# Patient Record
Sex: FEMALE | Race: WHITE | NOT HISPANIC OR LATINO
[De-identification: names, ages, dates, MRNs, and addresses within clinical notes are randomized per-mention and may not be internally consistent; named-entity substitution may affect disease eponyms.]

---

## 2019-01-30 ENCOUNTER — TRANSCRIPTION ENCOUNTER (OUTPATIENT)
Age: 74
End: 2019-01-30

## 2019-02-23 ENCOUNTER — TRANSCRIPTION ENCOUNTER (OUTPATIENT)
Age: 74
End: 2019-02-23

## 2019-02-27 ENCOUNTER — TRANSCRIPTION ENCOUNTER (OUTPATIENT)
Age: 74
End: 2019-02-27

## 2020-03-01 ENCOUNTER — TRANSCRIPTION ENCOUNTER (OUTPATIENT)
Age: 75
End: 2020-03-01

## 2020-09-08 PROBLEM — Z00.00 ENCOUNTER FOR PREVENTIVE HEALTH EXAMINATION: Status: ACTIVE | Noted: 2020-09-08

## 2020-10-15 ENCOUNTER — APPOINTMENT (OUTPATIENT)
Dept: THORACIC SURGERY | Facility: CLINIC | Age: 75
End: 2020-10-15

## 2020-11-01 ENCOUNTER — OUTPATIENT (OUTPATIENT)
Dept: OUTPATIENT SERVICES | Facility: HOSPITAL | Age: 75
LOS: 1 days | End: 2020-11-01
Payer: MEDICARE

## 2020-11-09 ENCOUNTER — INPATIENT (INPATIENT)
Facility: HOSPITAL | Age: 75
LOS: 0 days | Discharge: HOME | DRG: 200 | End: 2020-11-10
Attending: STUDENT IN AN ORGANIZED HEALTH CARE EDUCATION/TRAINING PROGRAM | Admitting: STUDENT IN AN ORGANIZED HEALTH CARE EDUCATION/TRAINING PROGRAM
Payer: MEDICARE

## 2020-11-09 VITALS
DIASTOLIC BLOOD PRESSURE: 44 MMHG | RESPIRATION RATE: 18 BRPM | WEIGHT: 125 LBS | SYSTOLIC BLOOD PRESSURE: 105 MMHG | HEIGHT: 65 IN | TEMPERATURE: 100 F | HEART RATE: 82 BPM | OXYGEN SATURATION: 99 %

## 2020-11-09 LAB
ALBUMIN SERPL ELPH-MCNC: 3.9 G/DL — SIGNIFICANT CHANGE UP (ref 3.4–5)
ALP SERPL-CCNC: 92 U/L — SIGNIFICANT CHANGE UP (ref 40–120)
ALT FLD-CCNC: 106 U/L — HIGH (ref 12–42)
ANION GAP SERPL CALC-SCNC: 9 MMOL/L — SIGNIFICANT CHANGE UP (ref 9–16)
APPEARANCE UR: CLEAR — SIGNIFICANT CHANGE UP
APTT BLD: 29.3 SEC — SIGNIFICANT CHANGE UP (ref 27.5–35.5)
AST SERPL-CCNC: 82 U/L — HIGH (ref 15–37)
BACTERIA # UR AUTO: PRESENT /HPF
BASOPHILS # BLD AUTO: 0 K/UL — SIGNIFICANT CHANGE UP (ref 0–0.2)
BASOPHILS NFR BLD AUTO: 0 % — SIGNIFICANT CHANGE UP (ref 0–2)
BILIRUB SERPL-MCNC: 0.4 MG/DL — SIGNIFICANT CHANGE UP (ref 0.2–1.2)
BILIRUB UR-MCNC: NEGATIVE — SIGNIFICANT CHANGE UP
BUN SERPL-MCNC: 14 MG/DL — SIGNIFICANT CHANGE UP (ref 7–23)
CALCIUM SERPL-MCNC: 9.7 MG/DL — SIGNIFICANT CHANGE UP (ref 8.5–10.5)
CHLORIDE SERPL-SCNC: 101 MMOL/L — SIGNIFICANT CHANGE UP (ref 96–108)
CO2 SERPL-SCNC: 28 MMOL/L — SIGNIFICANT CHANGE UP (ref 22–31)
COLOR SPEC: YELLOW — SIGNIFICANT CHANGE UP
CREAT SERPL-MCNC: 0.6 MG/DL — SIGNIFICANT CHANGE UP (ref 0.5–1.3)
DIFF PNL FLD: NEGATIVE — SIGNIFICANT CHANGE UP
EOSINOPHIL # BLD AUTO: 0 K/UL — SIGNIFICANT CHANGE UP (ref 0–0.5)
EOSINOPHIL NFR BLD AUTO: 0 % — SIGNIFICANT CHANGE UP (ref 0–6)
EPI CELLS # UR: SIGNIFICANT CHANGE UP /HPF (ref 0–5)
GLUCOSE SERPL-MCNC: 107 MG/DL — HIGH (ref 70–99)
GLUCOSE UR QL: NEGATIVE — SIGNIFICANT CHANGE UP
HCT VFR BLD CALC: 33.3 % — LOW (ref 34.5–45)
HGB BLD-MCNC: 11.1 G/DL — LOW (ref 11.5–15.5)
INR BLD: 1.06 — SIGNIFICANT CHANGE UP (ref 0.88–1.16)
KETONES UR-MCNC: NEGATIVE — SIGNIFICANT CHANGE UP
LACTATE SERPL-SCNC: 0.8 MMOL/L — SIGNIFICANT CHANGE UP (ref 0.4–2)
LEUKOCYTE ESTERASE UR-ACNC: ABNORMAL
LYMPHOCYTES # BLD AUTO: 1.57 K/UL — SIGNIFICANT CHANGE UP (ref 1–3.3)
LYMPHOCYTES # BLD AUTO: 34 % — SIGNIFICANT CHANGE UP (ref 13–44)
MANUAL SMEAR VERIFICATION: SIGNIFICANT CHANGE UP
MCHC RBC-ENTMCNC: 30.5 PG — SIGNIFICANT CHANGE UP (ref 27–34)
MCHC RBC-ENTMCNC: 33.3 GM/DL — SIGNIFICANT CHANGE UP (ref 32–36)
MCV RBC AUTO: 91.5 FL — SIGNIFICANT CHANGE UP (ref 80–100)
MONOCYTES # BLD AUTO: 0.88 K/UL — SIGNIFICANT CHANGE UP (ref 0–0.9)
MONOCYTES NFR BLD AUTO: 19 % — HIGH (ref 2–14)
NEUTROPHILS # BLD AUTO: 2.17 K/UL — SIGNIFICANT CHANGE UP (ref 1.8–7.4)
NEUTROPHILS NFR BLD AUTO: 47 % — SIGNIFICANT CHANGE UP (ref 43–77)
NITRITE UR-MCNC: NEGATIVE — SIGNIFICANT CHANGE UP
NRBC # BLD: 0 /100 — SIGNIFICANT CHANGE UP (ref 0–0)
NRBC # BLD: SIGNIFICANT CHANGE UP /100 WBCS (ref 0–0)
NT-PROBNP SERPL-SCNC: 124 PG/ML — SIGNIFICANT CHANGE UP
PCO2 BLDV: 45 MMHG — SIGNIFICANT CHANGE UP (ref 41–51)
PH BLDV: 7.4 — SIGNIFICANT CHANGE UP (ref 7.32–7.43)
PH UR: 7 — SIGNIFICANT CHANGE UP (ref 5–8)
PLAT MORPH BLD: NORMAL — SIGNIFICANT CHANGE UP
PLATELET # BLD AUTO: 199 K/UL — SIGNIFICANT CHANGE UP (ref 150–400)
PO2 BLDV: 34 MMHG — LOW (ref 35–40)
POTASSIUM SERPL-MCNC: 4.5 MMOL/L — SIGNIFICANT CHANGE UP (ref 3.5–5.3)
POTASSIUM SERPL-SCNC: 4.5 MMOL/L — SIGNIFICANT CHANGE UP (ref 3.5–5.3)
PROT SERPL-MCNC: 6.9 G/DL — SIGNIFICANT CHANGE UP (ref 6.4–8.2)
PROT UR-MCNC: NEGATIVE MG/DL — SIGNIFICANT CHANGE UP
PROTHROM AB SERPL-ACNC: 12.5 SEC — SIGNIFICANT CHANGE UP (ref 10.6–13.6)
RBC # BLD: 3.64 M/UL — LOW (ref 3.8–5.2)
RBC # FLD: 12.7 % — SIGNIFICANT CHANGE UP (ref 10.3–14.5)
RBC BLD AUTO: NORMAL — SIGNIFICANT CHANGE UP
RBC CASTS # UR COMP ASSIST: < 5 /HPF — SIGNIFICANT CHANGE UP
SAO2 % BLDV: 64 % — SIGNIFICANT CHANGE UP
SARS-COV-2 RNA SPEC QL NAA+PROBE: SIGNIFICANT CHANGE UP
SODIUM SERPL-SCNC: 138 MMOL/L — SIGNIFICANT CHANGE UP (ref 132–145)
SP GR SPEC: 1.01 — SIGNIFICANT CHANGE UP (ref 1–1.03)
TROPONIN I SERPL-MCNC: <0.017 NG/ML — LOW (ref 0.02–0.06)
UROBILINOGEN FLD QL: 0.2 E.U./DL — SIGNIFICANT CHANGE UP
WBC # BLD: 4.62 K/UL — SIGNIFICANT CHANGE UP (ref 3.8–10.5)
WBC # FLD AUTO: 4.62 K/UL — SIGNIFICANT CHANGE UP (ref 3.8–10.5)
WBC UR QL: ABNORMAL /HPF

## 2020-11-09 PROCEDURE — 71275 CT ANGIOGRAPHY CHEST: CPT | Mod: 26

## 2020-11-09 PROCEDURE — 71045 X-RAY EXAM CHEST 1 VIEW: CPT | Mod: 26

## 2020-11-09 PROCEDURE — 99285 EMERGENCY DEPT VISIT HI MDM: CPT | Mod: CS,25

## 2020-11-09 PROCEDURE — 93010 ELECTROCARDIOGRAM REPORT: CPT

## 2020-11-09 RX ORDER — ACETAMINOPHEN 500 MG
650 TABLET ORAL ONCE
Refills: 0 | Status: COMPLETED | OUTPATIENT
Start: 2020-11-09 | End: 2020-11-09

## 2020-11-09 RX ORDER — SODIUM CHLORIDE 9 MG/ML
1000 INJECTION INTRAMUSCULAR; INTRAVENOUS; SUBCUTANEOUS ONCE
Refills: 0 | Status: COMPLETED | OUTPATIENT
Start: 2020-11-09 | End: 2020-11-09

## 2020-11-09 RX ADMIN — SODIUM CHLORIDE 1000 MILLILITER(S): 9 INJECTION INTRAMUSCULAR; INTRAVENOUS; SUBCUTANEOUS at 18:33

## 2020-11-09 RX ADMIN — SODIUM CHLORIDE 1000 MILLILITER(S): 9 INJECTION INTRAMUSCULAR; INTRAVENOUS; SUBCUTANEOUS at 21:09

## 2020-11-09 RX ADMIN — Medication 650 MILLIGRAM(S): at 18:33

## 2020-11-09 NOTE — ED PROVIDER NOTE - OBJECTIVE STATEMENT
74 y/o F with PMH of adenocarcinoma of the left lung s/p microwave ablation with subsequent left PTX, treated and discharged from [facility] 2 days ago, now presents to the ED c/o right-sided chest tightness radiating to back and dyspnea on exertion over the past 24 hours. She states her symptoms are worse when she takes deep breaths, and somewhat improve when she is at rest. She states she did not want to return to [facility] because she was not happy with the care she received, so she decided to come here instead. 76 y/o F with PMH of adenocarcinoma of the left lung s/p microwave ablation with subsequent left PTX, treated and discharged from [facility] 2 days ago, now presents to the ED c/o right-sided chest tightness radiating to back and dyspnea on exertion over the past 24 hours. She states her symptoms are worse when she takes deep breaths, and somewhat improve when she is at rest. She states she did not want to return to [facility] because she was not happy with the care she received, so she decided to come here instead. No known fever at home, but reports feeling weak and fatigued.    Denies syncope, confusion, dysphagia, drooling, hemoptysis, abdo pain, N/V/D 76 y/o F with PMH of adenocarcinoma of the right lung s/p microwave ablation with subsequent right PTX, treated and discharged from [facility] 2 days ago, now presents to the ED c/o right-sided chest tightness radiating to back and dyspnea on exertion over the past 24 hours. She states her symptoms are worse when she takes deep breaths, and somewhat improve when she is at rest. She states she did not want to return to [facility] because she was not happy with the care she received, so she decided to come here instead. No known fever at home, but reports feeling weak and fatigued.    Denies syncope, confusion, dysphagia, drooling, hemoptysis, abdo pain, N/V/D

## 2020-11-09 NOTE — ED ADULT TRIAGE NOTE - CHIEF COMPLAINT QUOTE
Walk in with c/o band like CP and SOB and lethargy, increasing x2days. Pt was d/c from [facility] on Saturday s/p 5day stay for pneumothorax with chest tube removed just prior to discharge.

## 2020-11-09 NOTE — ED PROVIDER NOTE - ATTENDING CONTRIBUTION TO CARE
Pt is a 76yo F with a h/o R lung adenocarcinoma and is s/p microwave ablation recently that was complicated by R sided pneumothorax.  pt was admitted to  and had chest tube/pigtail placement.  Discharged 2 days ago.  She reports she was doing well until yesterday when she developed R sided CP and SOB, described as ZUNIGA.    ROS - Denies syncope, confusion, dysphagia, drooling, hemoptysis, abdo pain, N/V/D  PE - agree with PA exam as above.   A/P - R sided CP and ZUNIGA in setting of lung CA and recent pneumothorax.   IV, labs, Covid 19 swab, CXR.     CXR concerning and will obtain a CTA chest.     No PE.  +Apical pneumothorax on R side.  Will admit for continued treatment and monitoring.  Screened by MICU attending, Dr. Barahona.  Accepted to [facility] bed.

## 2020-11-09 NOTE — ED PROVIDER NOTE - CLINICAL SUMMARY MEDICAL DECISION MAKING FREE TEXT BOX
74 y/o F s/p microwave ablation with subsequent left PTX, D/C'd from [facility] 2 days ago, now presents to the ED with 24 hours of right-sided chest tightness radiating to back and dyspnea on exertion. Currently well-appearing and sitting comfortably in NAD. Will order labs, COVID swab, EKG, imaging and reassess.

## 2020-11-09 NOTE — ED ADULT NURSE NOTE - CHIEF COMPLAINT QUOTE
Walk in with c/o band like CP and SOB and lethargy, increasing x2days. Pt was d/c from [facility] on Saturday s/p 5day stay for pneumothorax with chest tube removed just prior to discharge.

## 2020-11-09 NOTE — ED PROVIDER NOTE - PROGRESS NOTE DETAILS
Labs and imaging were reviewed revealing right apical and inferior pneumothoraces. Patient is sitting comfortably in NAD. Well-appearing with no hypoxia or tachypnea. No previous imaging available here to compare findings. Case was discussed with Dr. Barahona (Intensivist) who recommends admission to [facility]. Labs and imaging were reviewed revealing right apical and inferior pneumothoraces. Patient is sitting comfortably in NAD. Well-appearing with no hypoxia or tachypnea. No previous imaging available here to compare findings. Case was discussed with Dr. Barahona (Intensivist) who recommends admission to [facility]. Case was then endorsed to Dr. Jean ([facility]) who has accepted the patient to [facility]. Pt consents to the admission.

## 2020-11-10 ENCOUNTER — TRANSCRIPTION ENCOUNTER (OUTPATIENT)
Age: 75
End: 2020-11-10

## 2020-11-10 VITALS
RESPIRATION RATE: 18 BRPM | TEMPERATURE: 99 F | SYSTOLIC BLOOD PRESSURE: 122 MMHG | HEART RATE: 68 BPM | OXYGEN SATURATION: 96 % | DIASTOLIC BLOOD PRESSURE: 69 MMHG

## 2020-11-10 DIAGNOSIS — C50.919 MALIGNANT NEOPLASM OF UNSPECIFIED SITE OF UNSPECIFIED FEMALE BREAST: ICD-10-CM

## 2020-11-10 DIAGNOSIS — D64.9 ANEMIA, UNSPECIFIED: ICD-10-CM

## 2020-11-10 DIAGNOSIS — Z29.9 ENCOUNTER FOR PROPHYLACTIC MEASURES, UNSPECIFIED: ICD-10-CM

## 2020-11-10 DIAGNOSIS — R60.0 LOCALIZED EDEMA: ICD-10-CM

## 2020-11-10 DIAGNOSIS — R63.8 OTHER SYMPTOMS AND SIGNS CONCERNING FOOD AND FLUID INTAKE: ICD-10-CM

## 2020-11-10 DIAGNOSIS — C34.90 MALIGNANT NEOPLASM OF UNSPECIFIED PART OF UNSPECIFIED BRONCHUS OR LUNG: ICD-10-CM

## 2020-11-10 DIAGNOSIS — J93.9 PNEUMOTHORAX, UNSPECIFIED: ICD-10-CM

## 2020-11-10 DIAGNOSIS — R74.01 ELEVATION OF LEVELS OF LIVER TRANSAMINASE LEVELS: ICD-10-CM

## 2020-11-10 LAB
ALBUMIN SERPL ELPH-MCNC: 3.6 G/DL — SIGNIFICANT CHANGE UP (ref 3.3–5)
ALP SERPL-CCNC: 76 U/L — SIGNIFICANT CHANGE UP (ref 40–120)
ALT FLD-CCNC: 65 U/L — HIGH (ref 10–45)
ANION GAP SERPL CALC-SCNC: 8 MMOL/L — SIGNIFICANT CHANGE UP (ref 5–17)
AST SERPL-CCNC: 51 U/L — HIGH (ref 10–40)
BILIRUB SERPL-MCNC: 0.3 MG/DL — SIGNIFICANT CHANGE UP (ref 0.2–1.2)
BLD GP AB SCN SERPL QL: NEGATIVE — SIGNIFICANT CHANGE UP
BUN SERPL-MCNC: 10 MG/DL — SIGNIFICANT CHANGE UP (ref 7–23)
CALCIUM SERPL-MCNC: 9.8 MG/DL — SIGNIFICANT CHANGE UP (ref 8.4–10.5)
CHLORIDE SERPL-SCNC: 101 MMOL/L — SIGNIFICANT CHANGE UP (ref 96–108)
CO2 SERPL-SCNC: 26 MMOL/L — SIGNIFICANT CHANGE UP (ref 22–31)
CREAT SERPL-MCNC: 0.45 MG/DL — LOW (ref 0.5–1.3)
GIANT PLATELETS BLD QL SMEAR: PRESENT — SIGNIFICANT CHANGE UP
GLUCOSE SERPL-MCNC: 97 MG/DL — SIGNIFICANT CHANGE UP (ref 70–99)
HAV IGM SER-ACNC: SIGNIFICANT CHANGE UP
HBV CORE IGM SER-ACNC: SIGNIFICANT CHANGE UP
HBV SURFACE AG SER-ACNC: SIGNIFICANT CHANGE UP
HCT VFR BLD CALC: 30.5 % — LOW (ref 34.5–45)
HCV AB S/CO SERPL IA: 0.08 S/CO — SIGNIFICANT CHANGE UP
HCV AB SERPL-IMP: SIGNIFICANT CHANGE UP
HGB BLD-MCNC: 10.2 G/DL — LOW (ref 11.5–15.5)
INR BLD: 0.97 — SIGNIFICANT CHANGE UP (ref 0.88–1.16)
MACROCYTES BLD QL: SLIGHT — SIGNIFICANT CHANGE UP
MAGNESIUM SERPL-MCNC: 2.2 MG/DL — SIGNIFICANT CHANGE UP (ref 1.6–2.6)
MANUAL SMEAR VERIFICATION: SIGNIFICANT CHANGE UP
MCHC RBC-ENTMCNC: 30.3 PG — SIGNIFICANT CHANGE UP (ref 27–34)
MCHC RBC-ENTMCNC: 33.4 GM/DL — SIGNIFICANT CHANGE UP (ref 32–36)
MCV RBC AUTO: 90.5 FL — SIGNIFICANT CHANGE UP (ref 80–100)
METAMYELOCYTES # FLD: 3.6 % — HIGH (ref 0–0)
MICROCYTES BLD QL: SLIGHT — SIGNIFICANT CHANGE UP
OVALOCYTES BLD QL SMEAR: SLIGHT — SIGNIFICANT CHANGE UP
PHOSPHATE SERPL-MCNC: 4.1 MG/DL — SIGNIFICANT CHANGE UP (ref 2.5–4.5)
PLAT MORPH BLD: ABNORMAL
PLATELET # BLD AUTO: 168 K/UL — SIGNIFICANT CHANGE UP (ref 150–400)
POLYCHROMASIA BLD QL SMEAR: SLIGHT — SIGNIFICANT CHANGE UP
POTASSIUM SERPL-MCNC: 4.1 MMOL/L — SIGNIFICANT CHANGE UP (ref 3.5–5.3)
POTASSIUM SERPL-SCNC: 4.1 MMOL/L — SIGNIFICANT CHANGE UP (ref 3.5–5.3)
PROT SERPL-MCNC: 5.7 G/DL — LOW (ref 6–8.3)
PROTHROM AB SERPL-ACNC: 11.6 SEC — SIGNIFICANT CHANGE UP (ref 10.6–13.6)
RBC # BLD: 3.37 M/UL — LOW (ref 3.8–5.2)
RBC # FLD: 12.8 % — SIGNIFICANT CHANGE UP (ref 10.3–14.5)
RBC BLD AUTO: ABNORMAL
RH IG SCN BLD-IMP: POSITIVE — SIGNIFICANT CHANGE UP
SODIUM SERPL-SCNC: 135 MMOL/L — SIGNIFICANT CHANGE UP (ref 135–145)
SPHEROCYTES BLD QL SMEAR: SLIGHT — SIGNIFICANT CHANGE UP
TSH SERPL-MCNC: 2.5 UIU/ML — SIGNIFICANT CHANGE UP (ref 0.35–4.94)
VARIANT LYMPHS # BLD: 1.8 % — SIGNIFICANT CHANGE UP (ref 0–6)
WBC # BLD: 4.4 K/UL — SIGNIFICANT CHANGE UP (ref 3.8–10.5)
WBC # FLD AUTO: 4.4 K/UL — SIGNIFICANT CHANGE UP (ref 3.8–10.5)

## 2020-11-10 PROCEDURE — 87040 BLOOD CULTURE FOR BACTERIA: CPT

## 2020-11-10 PROCEDURE — 85025 COMPLETE CBC W/AUTO DIFF WBC: CPT

## 2020-11-10 PROCEDURE — 84443 ASSAY THYROID STIM HORMONE: CPT

## 2020-11-10 PROCEDURE — 71045 X-RAY EXAM CHEST 1 VIEW: CPT

## 2020-11-10 PROCEDURE — 85027 COMPLETE CBC AUTOMATED: CPT

## 2020-11-10 PROCEDURE — 87635 SARS-COV-2 COVID-19 AMP PRB: CPT

## 2020-11-10 PROCEDURE — 86901 BLOOD TYPING SEROLOGIC RH(D): CPT

## 2020-11-10 PROCEDURE — 85730 THROMBOPLASTIN TIME PARTIAL: CPT

## 2020-11-10 PROCEDURE — 86900 BLOOD TYPING SEROLOGIC ABO: CPT

## 2020-11-10 PROCEDURE — 71275 CT ANGIOGRAPHY CHEST: CPT

## 2020-11-10 PROCEDURE — 93970 EXTREMITY STUDY: CPT

## 2020-11-10 PROCEDURE — 99223 1ST HOSP IP/OBS HIGH 75: CPT | Mod: GC

## 2020-11-10 PROCEDURE — 84100 ASSAY OF PHOSPHORUS: CPT

## 2020-11-10 PROCEDURE — 84484 ASSAY OF TROPONIN QUANT: CPT

## 2020-11-10 PROCEDURE — 87086 URINE CULTURE/COLONY COUNT: CPT

## 2020-11-10 PROCEDURE — 97161 PT EVAL LOW COMPLEX 20 MIN: CPT

## 2020-11-10 PROCEDURE — 86850 RBC ANTIBODY SCREEN: CPT

## 2020-11-10 PROCEDURE — 80074 ACUTE HEPATITIS PANEL: CPT

## 2020-11-10 PROCEDURE — 36415 COLL VENOUS BLD VENIPUNCTURE: CPT

## 2020-11-10 PROCEDURE — 81001 URINALYSIS AUTO W/SCOPE: CPT

## 2020-11-10 PROCEDURE — 93010 ELECTROCARDIOGRAM REPORT: CPT

## 2020-11-10 PROCEDURE — 93005 ELECTROCARDIOGRAM TRACING: CPT | Mod: 76

## 2020-11-10 PROCEDURE — 82803 BLOOD GASES ANY COMBINATION: CPT

## 2020-11-10 PROCEDURE — 80053 COMPREHEN METABOLIC PANEL: CPT

## 2020-11-10 PROCEDURE — 93970 EXTREMITY STUDY: CPT | Mod: 26

## 2020-11-10 PROCEDURE — 96361 HYDRATE IV INFUSION ADD-ON: CPT

## 2020-11-10 PROCEDURE — 83880 ASSAY OF NATRIURETIC PEPTIDE: CPT

## 2020-11-10 PROCEDURE — 83605 ASSAY OF LACTIC ACID: CPT

## 2020-11-10 PROCEDURE — 96360 HYDRATION IV INFUSION INIT: CPT | Mod: XU

## 2020-11-10 PROCEDURE — 85610 PROTHROMBIN TIME: CPT

## 2020-11-10 PROCEDURE — 99223 1ST HOSP IP/OBS HIGH 75: CPT

## 2020-11-10 PROCEDURE — 83735 ASSAY OF MAGNESIUM: CPT

## 2020-11-10 PROCEDURE — 99285 EMERGENCY DEPT VISIT HI MDM: CPT | Mod: 25

## 2020-11-10 PROCEDURE — 71045 X-RAY EXAM CHEST 1 VIEW: CPT | Mod: 26,76

## 2020-11-10 RX ORDER — ACETAMINOPHEN 500 MG
650 TABLET ORAL EVERY 6 HOURS
Refills: 0 | Status: DISCONTINUED | OUTPATIENT
Start: 2020-11-10 | End: 2020-11-10

## 2020-11-10 RX ORDER — INFLUENZA VIRUS VACCINE 15; 15; 15; 15 UG/.5ML; UG/.5ML; UG/.5ML; UG/.5ML
0.5 SUSPENSION INTRAMUSCULAR ONCE
Refills: 0 | Status: DISCONTINUED | OUTPATIENT
Start: 2020-11-10 | End: 2020-11-10

## 2020-11-10 RX ORDER — INFLUENZA VIRUS VACCINE 15; 15; 15; 15 UG/.5ML; UG/.5ML; UG/.5ML; UG/.5ML
0.7 SUSPENSION INTRAMUSCULAR ONCE
Refills: 0 | Status: DISCONTINUED | OUTPATIENT
Start: 2020-11-10 | End: 2020-11-10

## 2020-11-10 RX ORDER — ACETAMINOPHEN 500 MG
2 TABLET ORAL
Qty: 0 | Refills: 0 | DISCHARGE
Start: 2020-11-10

## 2020-11-10 RX ORDER — ENOXAPARIN SODIUM 100 MG/ML
40 INJECTION SUBCUTANEOUS EVERY 24 HOURS
Refills: 0 | Status: DISCONTINUED | OUTPATIENT
Start: 2020-11-10 | End: 2020-11-10

## 2020-11-10 NOTE — H&P ADULT - NSHPPHYSICALEXAM_GEN_ALL_CORE
.  VITAL SIGNS:  T(C): 36.7 (11-10-20 @ 01:16), Max: 37.7 (11-09-20 @ 17:49)  T(F): 98.1 (11-10-20 @ 01:16), Max: 99.8 (11-09-20 @ 17:49)  HR: 76 (11-10-20 @ 01:16) (67 - 82)  BP: 133/71 (11-10-20 @ 01:16) (105/44 - 133/71)  BP(mean): --  RR: 18 (11-10-20 @ 01:16) (16 - 18)  SpO2: 97% (11-10-20 @ 01:16) (96% - 100%)  Wt(kg): --    PHYSICAL EXAM:    Constitutional: Elderly female, resting comfortably in bed; NAD  Head: NC/AT  Eyes: PERRL, EOMI, anicteric sclera  ENT: no nasal discharge; uvula midline, no oropharyngeal erythema or exudates; MMM  Neck: supple; no JVD or thyromegaly  Respiratory: Decreased lung sounds RUL  Cardiac: +S1/S2; RRR; no M/R/G; PMI non-displaced  Gastrointestinal: soft, NT/ND; no rebound or guarding; +BSx4  Back: spine midline, no bony tenderness or step-offs; no CVAT B/L  Extremities: WWP, no clubbing or cyanosis; no peripheral edema  Musculoskeletal: NROM x4; no joint swelling, tenderness or erythema  Dermatologic: Healed scars Right anterior chest s/p chest tube   Neurologic: AAOx3; CNII-XII grossly intact; no focal deficits

## 2020-11-10 NOTE — H&P ADULT - NSHPLABSRESULTS_GEN_ALL_CORE
.  LABS:                         11.1   4.62  )-----------( 199      ( 2020 18:35 )             33.3         138  |  101  |  14  ----------------------------<  107<H>  4.5   |  28  |  0.60    Ca    9.7      2020 18:35    TPro  6.9  /  Alb  3.9  /  TBili  0.4  /  DBili  x   /  AST  82<H>  /  ALT  106<H>  /  AlkPhos  92      PT/INR - ( 2020 18:35 )   PT: 12.5 sec;   INR: 1.06          PTT - ( 2020 18:35 )  PTT:29.3 sec  Urinalysis Basic - ( 2020 23:23 )    Color: Yellow / Appearance: Clear / S.010 / pH: x  Gluc: x / Ketone: NEGATIVE  / Bili: NEGATIVE / Urobili: 0.2 E.U./dL   Blood: x / Protein: NEGATIVE mg/dL / Nitrite: NEGATIVE   Leuk Esterase: Small / RBC: < 5 /HPF / WBC 5-10 /HPF   Sq Epi: x / Non Sq Epi: 0-5 /HPF / Bacteria: Present /HPF      CARDIAC MARKERS ( 2020 18:35 )  <0.017 ng/mL / x     / x     / x     / x          Serum Pro-Brain Natriuretic Peptide: 124 pg/mL ( @ 18:35)    Lactate, Blood: 0.8 mmoL/L ( @ 18:36)      RADIOLOGY, EKG & ADDITIONAL TESTS: Reviewed.

## 2020-11-10 NOTE — H&P ADULT - PROBLEM SELECTOR PLAN 5
Elevated AST/ALT on admission 82/106. Denies any abd pain, no hx of hepatitis, no alcohol use. 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 Denies recent weight loss.   - trend LFTs  - collateral info, previous PET scan    - f/up hepatitis panel   - consider abd US

## 2020-11-10 NOTE — H&P ADULT - HISTORY OF PRESENT ILLNESS
A 74yo F with PMH of adenocarcinoma of the right lung s/p microwave ablation with subsequent right PTX, s/p Chest tube (now removed) and discharged from [facility] 2 days ago, now presents to [facility] with right-sided chest tightness radiating to back and dyspnea on exertion over the past 24 hours. She states her symptoms are worse when she takes deep breaths, and somewhat improve when she is at rest. She states she did not want to return to [facility] because she was not happy with the care she received, so she decided to come here instead. No known fever at home, but reports feeling weak and fatigued.    In ED VS: T 99.8, HR 82, /44, RR 18 and SpO2 99% on RA. Labs s/f hgb 11.1, AST/ALT 82/106, BNP wnl, trops negative x1. CTA with no PE, Right upper lobe consolidative mass measuring 3.7 x 5.5 x 2.7 cm with adjacent ground glass nodular subcentimeter opacities and bronchial wall thickening; Right apical PTX with emphysemous changes c/w recent Chest tube placement. Pt given 1L NS bolus and tylenol x1.    A 76yo F with PMH of adenocarcinoma of the right lung s/p microwave ablation with subsequent right PTX, s/p Chest tube (now removed) and discharged from [facility] 2 days ago, now presents to [facility] with right-sided chest tightness radiating to back and dyspnea on exertion over the past 24 hours. She states her symptoms are worse when she takes deep breaths, and somewhat improve when she is at rest. She states she did not want to return to [facility] because she was not happy with the care she received, so she decided to come here instead. She denies fevers/chills, Cough, palpitations, abd pain, n/v, diarhea.     In ED VS: T 99.8, HR 82, /44, RR 18 and SpO2 99% on RA. Labs s/f hgb 11.1, AST/ALT 82/106, BNP wnl, trops negative x1. CTA with no PE, Right upper lobe consolidative mass measuring 3.7 x 5.5 x 2.7 cm with adjacent ground glass nodular subcentimeter opacities and bronchial wall thickening; Right apical PTX with emphysemous changes c/w recent Chest tube placement. Pt given 1L NS bolus and tylenol x1.    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 She states her symptoms are worse when she takes deep breaths, and somewhat improve when she is at rest. She states she did not want to return to [facility] because she was not happy with the care she received, so she decided to come here instead. She denies fevers/chills, Cough, palpitations, abd pain, n/v, diarhea.     In ED VS: T 99.8, HR 82, /44, RR 18 and SpO2 99% on RA. Labs s/f hgb 11.1, AST/ALT 82/106, BNP wnl, trops negative x1. CTA with no PE, Right upper lobe consolidative mass measuring 3.7 x 5.5 x 2.7 cm with adjacent ground glass nodular subcentimeter opacities and bronchial wall thickening; Right apical PTX with emphysemous changes c/w recent Chest tube placement. Pt given 1L NS bolus and tylenol x1.

## 2020-11-10 NOTE — H&P ADULT - PROBLEM SELECTOR PLAN 2
PT with hx of adenocarcinoma of the right lung (diagnosed last yr) s/p microwave ablation with subsequent right PTX. Reportedly pt recommended for surgery however opted for microwave ablation. PMH of smoking and family hx of cancer. Follows at [facility] and [facility]   - [facility] in AM  - management of PTX above PT with hx of adenocarcinoma of the right lung (diagnosed 2018) s/p microwave ablation with subsequent right PTX. Reportedly pt recommended for surgery however opted for microwave ablation. PMH of smoking and family hx of cancer. Follows at [facility] (Dr. Peres) and [facility]   - [facility] in AM  - management of PTX above

## 2020-11-10 NOTE — DISCHARGE NOTE NURSING/CASE MANAGEMENT/SOCIAL WORK - PATIENT PORTAL LINK FT
You can access the FollowMyHealth Patient Portal offered by [facility] by registering at the following website: [email or web address]. By joining [facility]’s FollowMyHealth portal, you will also be able to view your health information using other applications (apps) compatible with our system.

## 2020-11-10 NOTE — H&P ADULT - PROBLEM SELECTOR PLAN 7
DVT: LOvenox    FULL CODE  RMF DVT: Hold AC for now; restart pending CT surgery recs     FULL CODE  RMF F: none   E: replete prn   N: NPO for now

## 2020-11-10 NOTE — DISCHARGE NOTE PROVIDER - CARE PROVIDER_API CALL
Margo Hua  INTERNAL MEDICINE  [address] [place] [address]  Phone: [phone]  Fax: [phone]  Follow Up Time: Routine

## 2020-11-10 NOTE — CONSULT NOTE ADULT - ASSESSMENT
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 Patient states that she does not wish to return to [facility] as she was unhappy with the care she received. Upon arrival patient found to have Right Apical Pneumothorax on CT, patient reports no shortness of breath, SpO2 97% RA. CT Surgery consulted for intervention and evaluation of pneumothorax.       Neurovascular:   No delirium. Pain well controlled    Cardiovascular:   Hemodynamically stable. HR controlled  per primary team    Respiratory:   02 Sat = 96% on RA.  Clinically stable small apical pneumothorax, no chest tube indicated at this time.   -100% NRB as tolerate for pneumothorax   -Recommend cxr q12 to assess change  -STAT cxr if rapid deterioration   -Encourage Cough, deep breathing and Use of IS 10x / hr while awake.  -Chest PT 4xdaily    GI:   Stable  care per primary team    Renal / :   BUN/Cr Stable  -Continue to monitor I/O's.  care per primary team    Endocrine:    care per primary team     Hematologic:  H/H stable  care per primary team      Case Discussed with Dr. Carpenter, Thoracic surgery will continue to follow. Please contact with any questions or concerns.

## 2020-11-10 NOTE — CHART NOTE - NSCHARTNOTEFT_GEN_A_CORE
Patient discussed with Dr. Carpenter and Dr. Smith.    Chest xray stable.  No intervention required at this time.    Thoracic surgery will sign off.  Please reconsult if needed.

## 2020-11-10 NOTE — CONSULT NOTE ADULT - SUBJECTIVE AND OBJECTIVE BOX
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 She states her symptoms are worse when she takes deep breaths, and somewhat improve when she is at rest. She states she did not want to return to [facility] because she was not happy with the care she received, so she decided to come here instead. She denies fevers/chills, Cough, palpitations, abd pain, n/v, diarhea.     In ED VS: T 99.8, HR 82, /44, RR 18 and SpO2 99% on RA. Labs s/f hgb 11.1, AST/ALT 82/106, BNP wnl, trops negative x1. CTA with no PE, Right upper lobe consolidative mass measuring 3.7 x 5.5 x 2.7 cm with adjacent ground glass nodular subcentimeter opacities and bronchial wall thickening; Right apical PTX with emphysemous changes c/w recent Chest tube placement. Pt given 1L NS bolus and tylenol x1.    (10 Nov 2020 01:12)      PAST MEDICAL & SURGICAL HISTORY:  Breast cancer    Lung cancer        MEDICATIONS  (STANDING):  influenza  Vaccine (HIGH DOSE) 0.7 milliLiter(s) IntraMuscular once    MEDICATIONS  (PRN):  acetaminophen   Tablet .. 650 milliGRAM(s) Oral every 6 hours PRN Mild Pain (1 - 3)      FAMILY HISTORY:      CBC Full  -  ( 10 Nov 2020 07:46 )  WBC Count : 4.40 K/uL  RBC Count : 3.37 M/uL  Hemoglobin : 10.2 g/dL  Hematocrit : 30.5 %  Platelet Count - Automated : 168 K/uL  Mean Cell Volume : 90.5 fl  Mean Cell Hemoglobin : 30.3 pg  Mean Cell Hemoglobin Concentration : 33.4 gm/dL  Auto Neutrophil # : x  Auto Lymphocyte # : x  Auto Monocyte # : x  Auto Eosinophil # : x  Auto Basophil # : x  Auto Neutrophil % : x  Auto Lymphocyte % : x  Auto Monocyte % : x  Auto Eosinophil % : x  Auto Basophil % : x      11-10    135  |  101  |  10  ----------------------------<  97  4.1   |  26  |  0.45<L>    Ca    9.8      10 Nov 2020 07:02  Phos  4.1     11-10  Mg     2.2     10    TPro  5.7<L>  /  Alb  3.6  /  TBili  0.3  /  DBili  x   /  AST  51<H>  /  ALT  65<H>  /  AlkPhos  76  11-10      Urinalysis Basic - ( 2020 23:23 )    Color: Yellow / Appearance: Clear / S.010 / pH: x  Gluc: x / Ketone: NEGATIVE  / Bili: NEGATIVE / Urobili: 0.2 E.U./dL   Blood: x / Protein: NEGATIVE mg/dL / Nitrite: NEGATIVE   Leuk Esterase: Small / RBC: < 5 /HPF / WBC 5-10 /HPF   Sq Epi: x / Non Sq Epi: 0-5 /HPF / Bacteria: Present /HPF          Radiology:    < from: CT Angio Chest PE Protocol w/ IV Cont (20 @ 19:40) >  EXAM:  CT ANGIO CHEST PE PROTOCOL IC                           PROCEDURE DATE:  2020          INTERPRETATION:  CTA (CT angiography) of the CHEST    INDICATION: Chest pain. Straight lung cancer and right-sided pneumothorax. Assess for pulmonary embolism.    TECHNIQUE: CT angiography of the chest was performed during bolus injection of intravenous contrast. Post-processing including the production of axial, coronal and sagittal multiplanar reformatted images and axial and coronal maximum intensity projections (MIPs) was performed.    PRIOR STUDY: None.    FINDINGS:    Pulmonary arteries: No pulmonary embolism is seen. The main pulmonary artery is normal in caliber.    Lungs and large airways: In the right upper lobe there is a 3.7 x 5.5x 2.7 cm consolidative mass with a small area of adjacent groundglass nodular opacification. Within this mass there are multiple small cystic lucencies largest measuring 8 mm (3:49, and coronal series 603:69). There is wall thickening of a right upper subsegmental bronchus contiguous with the mass. There are multiple adjacent bilateral scattered groundglass nodules measuring approximately 4 to 6 cm. There is mosaic attenuation pattern seen within the left upper lobe (coronal series 603:78-63, andaxial 3-D: 40-63). This may represent air trapping from underlying small airways disease or an atypical infectious and/or inflammatory process. Bibasilar peripheral groundglass opacities thought to represent dependent change.    Pleura:  Small right apical pneumothorax and separate smaller right pneumothorax anterior to the mediastinum adjacent to the inferior right upper lobe. No pleural effusions.    Mediastinum and hilar regions: No thoracic lymphadenopathy.    Cardiovascular:  Heart size is normal. No thoracic aortic aneurysm.    Pericardial effusion: No pericardial effusion.    Chest wall and lower neck:  Subcutaneous gas along the right anterior and lateral chest wall. Clip versus microcalcification within the left breast (2:81). Asymmetry of the left breast parenchyma((2:102) clinical mammographic correlation.    Upper abdomen: 5 mm hyperdense lesion liver segment 7, likely a benign hemangioma. 1.6 cm left upper pole renal cyst. A few additional bilateral subcentimeter renal hypodensities, too small to characterize.    Bones: There is moderate dextroscoliosis of the midthoracic spine. Multiple prominent hemangiomas are noted the largest involving T9. No vertebral body collapse. No aggressive osseous lesions.      IMPRESSION:    1. No pulmonary embolism.  2. Right upper lobe consolidative mass measuring 3.7 x 5.5 x 2.7 cm with adjacent ground glass nodular subcentimeter opacities and bronchial wall thickening. These findings are consistent with the patient's known history oflung carcinoma demonstrating lepidic growth. In addition, there are numerous groundglass nodules throughout the bilateral lungs some of which has a mosaic attenuation pattern to suggest an atypical pneumonia and/or air trapping especially in the leftupper lobe. Some of these findings may represent the sequela of recent microwave ablation.  3. Small right apical pneumothorax.  4. Anterior and lateral chest wall subcutaneous emphysema likely secondary to prior chest tube placement.  5. Surgical clip noted in the left breast with asymmetry of the breast parenchyma. Clinical and mammographic correlation is suggested.              Vital Signs Last 24 Hrs  T(C): 36.6 (10 Nov 2020 05:38), Max: 37.7 (2020 17:49)  T(F): 97.9 (10 Nov 2020 05:38), Max: 99.8 (2020 17:49)  HR: 58 (10 Nov 2020 05:38) (58 - 82)  BP: 122/69 (10 Nov 2020 05:38) (105/44 - 133/71)  BP(mean): --  RR: 18 (10 Nov 2020 05:38) (16 - 18)  SpO2: 100% (10 Nov 2020 05:38) (96% - 100%)    REVIEW OF SYSTEMS: per HPI    Physical Exam:  76 yo  woman lying in semi Savage's position, no acute complaints    Head: normocephalic, atraumatic    Eyes: PERRLA, EOMI, no nystagmus, sclera anicteric    ENT: nasal discharge, uvula midline, no oropharyngeal erythema/exudate    Neck: supple, negative JVD, negative carotid bruits, no thyromegaly    Chest: dec BS RUL    Cardiovascular: regular rate and rhythm, neg murmurs/rubs/gallops    Abdomen: soft, non distended, non tender to palpation in all 4 quadrants, negative rebound/guarding, normal bowel sounds    Extremities: WWP, neg cyanosis/clubbing/edema, negative calf tenderness to palpation, negative Gracie's sign    Musculoskeletal:      :     Neurologic Exam:    Motor Exam:    Upper Extremities:     Right:   no focal weakness > 3+/5    Left:     no focal weakness > 3+/5      Lower Extremities:    Right:    no focal weakness > 3+/5    Left :    no focal weakness > 3+/5               Sensation: Intact to light touch x 4 extremities,                                         DTR:            = biceps/     triceps/     brachioradialis                      = patella/   medial hamstring/ankle                      neg clonus                      neg Babinski                        Gait:  not tested        PM&R Impression:    1)    2) no focal weakness    Plan:    1) Physical therapy focusing on therapeutic exercises, bed mobility/transfer out of bed evaluation, progressive ambulation with assistive devices prn.    2) Anticipated Disposition Plan/Recs:    pending functional progress, probable d/c home +/- home physical therapy

## 2020-11-10 NOTE — CONSULT NOTE ADULT - ASSESSMENT
per Internal Medicine    74 yo F with PMH of adenocarcinoma of the right lung s/p microwave ablation with subsequent right PTX, s/p Chest tube (now removed) and discharged from [facility] 2 days ago, now presents to [facility] with right-sided chest tightness radiating to back and dyspnea on exertion over the past 24 hours. CTA with right apical PTX    Problem/Plan - 1:  ·  Problem: Pneumothorax.  Plan: PT presents with right sided apical CP that radiates to back, worse with inhalation. Recent microwave ablation procedure (10/29) by [facility] Dr. Brock complicated by right PTX s/p chest tube placed last week and removed 2 days ago; EKG w/o ischemic changes. Trops negative x1. CTA negative for PE Right upper lobe consolidative mass measuring 3.7 x 5.5 x 2.7 cm with adjacent ground glass nodular subcentimeter opacities and bronchial wall thickening; Right apical PTX with emphysemous changes consistent with recent Chest tube placement. Pt currently HDS and sating well on RA  - serial CXR  - consult CT surgery   - pain control  - monitor vitals, O2 requirments   - collateral from [facility] and [facility].    Problem/Plan - 2:  ·  Problem: Lung cancer.  Plan: PT with hx of adenocarcinoma of the right lung (diagnosed 2018) s/p microwave ablation with subsequent right PTX. Reportedly pt recommended for surgery however opted for microwave ablation. PMH of smoking and family hx of cancer. Follows at [facility] (Dr. Peres) and [facility]   - collateral in AM  - management of PTX above.    Problem/Plan - 3:  ·  Problem: Breast cancer.  Plan: PT reports hx of Left breast cancer 2018; per pt treated with herbal supplementation; at the time recommended for mastectomy. Per pt subsequent PET scan showed "cancer resolved". Declines breast exam, denies any masses  - collateral info from [facility] Dr. Peres.    Problem/Plan - 4:  ·  Problem: Anemia.  Plan: Pt with hx of Anemia; unknown baseline. Hgb 11 on admission. No s/s of bleeding. Per pt, was being worked up for ?Leukemia and ?MM; recommended for BM biopsy but pt declined. Calcium and cr wnl.   - collateral in AM  - Active T/S  - continue to monitor.     Problem/Plan - 5:  ·  Problem: Transaminitis.  Plan: Elevated AST/ALT on admission 82/106. Denies any abd pain, no hx of hepatitis, no alcohol use. 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 Denies recent weight loss.   - trend LFTs  - collateral info, previous PET scan    - f/up hepatitis panel   - consider abd US.     Problem/Plan - 6:  Problem: Nutrition, metabolism, and development symptoms. Plan: F: none   E: replete prn   N: NPO for now.    Problem/Plan - 7:  ·  Problem: Need for prophylactic measure.  Plan: DVT: Hold AC for now; restart pending CT surgery recs     FULL CODE  RMF.

## 2020-11-10 NOTE — CONSULT NOTE ADULT - ATTENDING COMMENTS
Agree with documentation above with following additions:    Patient seen this morning. Reports no shortness of breath. Has some pain and numbness along her chest wall radiating to the anterior chest wall. It is in the distribution of the intercostal space where her intrapleural catheter was placed. She has some scapular pain. Otherwise she has no complaints.     On exam she is sitting comfortably in bed, no respiratory distress and no supplemental oxygen. She has point tenderness on her lateral chest wall superior to her catheter dressing.     CT scan reviewed and pneumothorax is small.     Assessment:   S/p ablation for RUL lung cancer. Apical pneumothorax found on CT scan. Asymptomatic and small. Pain patient complains of is likely musculoskeletal.    Recommendations:  -No drain required for pneumothorax  -Can continue NRB mask and repeat x-ray this afternoon  -Continue pain control for musculoskeletal pain    We will sign off at this point, please contact us with any further questions or concerns.     Alan Carpenter MD  Attending Thoracic Surgeon

## 2020-11-10 NOTE — DISCHARGE NOTE PROVIDER - NSDCCPCAREPLAN_GEN_ALL_CORE_FT
PRINCIPAL DISCHARGE DIAGNOSIS  Diagnosis: Pneumothorax, right  Assessment and Plan of Treatment: A pneumothorax means there is air trapped between the lung and the wall of the chest. We think your pneumothorax is related to the ablation you had. We consulted our cardiothoracic surgeons and they recommended against putting in a chest tube given how small your pneumothorax is. If your shortness of breath gets worse or if you experience more pain in your chest, please come back to the ED so they can take a new chest xray and compare it to the ones we took today.      SECONDARY DISCHARGE DIAGNOSES  Diagnosis: Anemia  Assessment and Plan of Treatment: Your hemoglobin level was 10.2 and showed premature blood cells called Metamyelocytes. We are not sure what this means without running additional blood tests and maybe a bone marrow biopsy. When you talk with an oncologist, please discuss whether you should get more evaluation for your anemia with abnormal blood cells.    Diagnosis: Lung cancer  Assessment and Plan of Treatment: Your lung cancer was treated with microwave ablation, but you should still follow up with an oncologist to determine how often you need re-evaluation and to optimize the rest of your medical problems.

## 2020-11-10 NOTE — H&P ADULT - PROBLEM SELECTOR PLAN 6
F: none   E: replete prn   N: regular F: none   E: replete prn   N: NPO for now ADDENDUM: +lower ext edema, R>L, followup dopplers of lower ext

## 2020-11-10 NOTE — PHYSICAL THERAPY INITIAL EVALUATION ADULT - PERTINENT HX OF CURRENT PROBLEM, REHAB EVAL
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 She states her symptoms are worse when she takes deep breaths, and somewhat improve when she is at rest.

## 2020-11-10 NOTE — H&P ADULT - PROBLEM SELECTOR PLAN 1
PT presents with right sided apical CP that radiates to back, worse with inhalation. Recently diagnosed with right PTX after Lung ablation procedure s/p chest tube placed last week and removed 2 days ago; EKG w/o ischemic changes. Trops negative x1. CTA negative for PE Right upper lobe consolidative mass measuring 3.7 x 5.5 x 2.7 cm with adjacent ground glass nodular subcentimeter opacities and bronchial wall thickening; Right apical PTX with emphysemous changes consistent with recent Chest tube placement. Pt currently HDS and sating well on RA  - serial CXR  - consult CT surgery   - pain control   - Incentive spirometry   - monitor vitals, O2 requirments   - [facility] from [facility] PT presents with right sided apical CP that radiates to back, worse with inhalation. Recent microwave ablation procedure (10/29) by [facility] complicated by right PTX s/p chest tube placed last week and removed 2 days ago; EKG w/o ischemic changes. Trops negative x1. CTA negative for PE Right upper lobe consolidative mass measuring 3.7 x 5.5 x 2.7 cm with adjacent ground glass nodular subcentimeter opacities and bronchial wall thickening; Right apical PTX with emphysemous changes consistent with recent Chest tube placement. Pt currently HDS and sating well on RA  - serial CXR  - consult CT surgery   - pain control  - monitor vitals, O2 requirments   - collateral from [facility]

## 2020-11-10 NOTE — H&P ADULT - ATTENDING COMMENTS
patient seen and examined overnight     reviewed pertinent data , h&p    PE findings as above except lungs CTA b/l; no crepitus noted on palpation of skin at previous Right sided chest tube insertion site ; +lower ext nonpitting edema R>L      1. pneumothorax: small residual PTX, in setting of recent larger PTX s/p microwave ablation; s/p chest tube insertion and removal at ; appreciate CT surgery recs; on 100%NRB o/n, monitor serial CXRs        rest of  plan as above, with noted addenda

## 2020-11-10 NOTE — DISCHARGE NOTE PROVIDER - NSDCFUADDAPPT_GEN_ALL_CORE_FT
If you would like to follow in our general medicine resident clinic, we can connect you with a new primary care physician. Please call [phone] and mention that you were recently hospitalized at [facility] and want to establish care.    Should you choose to pursue pulmonology follow-up with our clinic and oncology follow-up. You can call the following numbers to set up an appointment. If you would like to follow in our general medicine resident clinic, we can connect you with a new primary care physician. Please call [phone] and mention that you were recently hospitalized at [facility] and want to establish care.    Should you choose to pursue pulmonology follow-up, you can call the following number to set up an appointment: [phone]    An appointment will be made for you to follow-up at our oncology clinic. You will have an appointment with Dr. Jacinto Neal on December 12. The clinic will call you to share the time of your appointment. Should you have any questions, the oncology clinic is located at [address] [place] [address], and the phone number is [phone].

## 2020-11-10 NOTE — H&P ADULT - PROBLEM SELECTOR PLAN 4
Pt with hx of Anemia; unknown baseline. Hgb 11 on admission. No s/s of bleeding. Per pt, was being worked up for ?Leukemia and ?MM; recommended for BM biopsy but pt declined. Calcium and cr wnl.   - collateral in AM  - Active T/S  - continue to monitor

## 2020-11-10 NOTE — DISCHARGE NOTE PROVIDER - HOSPITAL COURSE
#Discharge: do not delete    Patient is 74 yo F with past medical history of stage IIb adenocarcinoma of R lung, L breast cancer, skin cancer, brain aneurysm, Hashimoto's s/p microwave ablation 10/29/20 of R lung cancer complicated by subsequent moderate R pneumothorax treated with chest tube (since removed) on 11/3/20.  Presenting with 24 hours pleuritic chest pain and tightness with exertion and deep inhalations two days after discharge from pneumothorax treatment at [facility], found to have R apical pneumothorax on CTA.   Problem List/Main Diagnoses (system-based):   #Pneumothorax  - CTA confirming R pneumothorax, non-operatively managed per CT surgery  # Lung cancer  - s/p microwave ablation 10/29  # Anemia  - no clinical signs of bleeding, diff only notable for increase in metamyelocytes, pt has refused bone marrow biopsy during prior admissions, CBCs trended over stay  # Transaminitis  - elevated AST/ALT found on ED labs, downtrending after IV fluids given  # Weakness  - Likely 2/2 to multiple malignancies, anemia, or pain from pneumothorax  Inpatient treatment course:   74 yo F with cancer history presenting 2 days s/p leaving [facility] after being admitted for management of pneumothorax.   ED stay was notable for CTA showing no PE but small R apical pneumothorax, labs showing BNP and troponin within normal limits, but anemia with Hgb 11 and mild transaminitis that improved after administration of IV fluids. Serial CXR show stable R apical pneumothorax and patient reported lessening of pain over the course of her stay without use of pain medications.   CT surgery was consulted and recommended continuation of serial CXR, observation and PT. Pt has been ambulating well and O2 sat has been above 95% on RA throughout stay. Pt initially put on 100% O2 non-rebreather mask per CT surgery.     New medications: None    Labs to be followed outpatient: None    Exam to be followed outpatient: None     #Discharge: do not delete    Patient is 74 yo F with past medical history of stage IIb adenocarcinoma of R lung, L breast cancer, skin cancer, brain aneurysm, Hashimoto's s/p microwave ablation 10/29/20 of R lung cancer complicated by subsequent moderate R pneumothorax treated with chest tube (since removed) on 11/3/20.  Presenting with 24 hours pleuritic chest pain and tightness with exertion and deep inhalations two days after discharge from pneumothorax treatment at [facility], found to have R apical pneumothorax on CTA.   Problem List/Main Diagnoses (system-based):   #Pneumothorax  - CTA confirming R pneumothorax, non-operatively managed per CT surgery, stable on serial CXR with no hemodynamic instability or hypoxia.  # Lung cancer  - s/p microwave ablation 10/29, patient initially followed at [facility] then transitioned to [facility], no looking to re-establish elsewhere.  # Anemia  - no clinical signs of bleeding, diff only notable for increase in metamyelocytes, pt has refused bone marrow biopsy during prior admissions, CBCs trended over stay  # Transaminitis  - elevated AST/ALT found on ED labs, downtrending after IV fluids given  # Weakness  - Likely 2/2 to malignancies, anemia, or pain from pneumothorax  Inpatient treatment course:   74 yo F with cancer history presenting 2 days s/p leaving [facility] after being admitted for management of pneumothorax.   ED stay was notable for CTA showing no PE but small R apical pneumothorax, labs showing BNP and troponin within normal limits, but anemia with Hgb 11 and mild transaminitis that improved after administration of IV fluids. Serial CXR show stable R apical pneumothorax and patient reported lessening of pain over the course of her stay without use of pain medications.   CT surgery was consulted and recommended continuation of serial CXR, observation and PT. Pt has been ambulating well and O2 sat has been above 95% on RA throughout stay. Pt initially put on 100% O2 non-rebreather mask per CT surgery.     MHx notable for breast cancer with no treatment, lung cancer initially followed at [facility]. Oncologist (Ja) there recommended lobectomy but patient did not agree, obtained second opinion from [facility] and microwave ablation done by Dr. May. Patient reports residual pain and dyspnea after procedure and feels that she was not adequately informed of risks prior to procedure. After the ablation she presented to [facility] for dyspnea and pleuritic chest pain and was found to have pneumothorax with pigtail placed and monitored for several days. Patient feels hurt and disappointed by her care at [facility] and wants to transition oncology to a new system. She also does not have a primary care provider as her previous PCP retired in 2020.    New medications: None    Labs to be followed outpatient: None    Exam to be followed outpatient: None

## 2020-11-10 NOTE — DISCHARGE NOTE NURSING/CASE MANAGEMENT/SOCIAL WORK - NSDCFUADDAPPT_GEN_ALL_CORE_FT
If you would like to follow in our general medicine resident clinic, we can connect you with a new primary care physician. Please call [phone] and mention that you were recently hospitalized at [facility] and want to establish care.    Should you choose to pursue pulmonology follow-up, you can call the following number to set up an appointment: [phone]    An appointment will be made for you to follow-up at our oncology clinic. You will have an appointment with Dr. Jacinto Neal on December 12. The clinic will call you to share the time of your appointment. Should you have any questions, the oncology clinic is located at [address] [place] [address], and the phone number is [phone].

## 2020-11-10 NOTE — H&P ADULT - ASSESSMENT
A 76yo F with PMH of adenocarcinoma of the right lung s/p microwave ablation with subsequent right PTX, s/p Chest tube (now removed) and discharged from [facility] 2 days ago, now presents to [facility] with right-sided chest tightness radiating to back and dyspnea on exertion over the past 24 hours. CTA with right apical PTX

## 2020-11-10 NOTE — H&P ADULT - PROBLEM SELECTOR PLAN 3
PT reports hx of Left breast cancer 2018; per pt treated with herbal supplementation; at the time recommended for mastectomy. Per pt subsequent PET scan showed "cancer resolved". Declines breast exam, denies any masses  - collateral info from [facility] PT reports hx of Left breast cancer 2018; per pt treated with herbal supplementation; at the time recommended for mastectomy. Per pt subsequent PET scan showed "cancer resolved". Declines breast exam, denies any masses  - collateral info from [facility] Dr. Peres

## 2020-11-11 PROBLEM — C34.90 MALIGNANT NEOPLASM OF UNSPECIFIED PART OF UNSPECIFIED BRONCHUS OR LUNG: Chronic | Status: ACTIVE | Noted: 2020-11-09

## 2020-11-11 PROBLEM — C50.919 MALIGNANT NEOPLASM OF UNSPECIFIED SITE OF UNSPECIFIED FEMALE BREAST: Chronic | Status: ACTIVE | Noted: 2020-11-09

## 2020-11-11 LAB
CULTURE RESULTS: SIGNIFICANT CHANGE UP
SPECIMEN SOURCE: SIGNIFICANT CHANGE UP

## 2020-11-12 DIAGNOSIS — E06.3 AUTOIMMUNE THYROIDITIS: ICD-10-CM

## 2020-11-12 DIAGNOSIS — C34.11 MALIGNANT NEOPLASM OF UPPER LOBE, RIGHT BRONCHUS OR LUNG: ICD-10-CM

## 2020-11-12 DIAGNOSIS — Z85.89 PERSONAL HISTORY OF MALIGNANT NEOPLASM OF OTHER ORGANS AND SYSTEMS: ICD-10-CM

## 2020-11-12 DIAGNOSIS — Z87.891 PERSONAL HISTORY OF NICOTINE DEPENDENCE: ICD-10-CM

## 2020-11-12 DIAGNOSIS — J93.9 PNEUMOTHORAX, UNSPECIFIED: ICD-10-CM

## 2020-11-12 DIAGNOSIS — D64.9 ANEMIA, UNSPECIFIED: ICD-10-CM

## 2020-11-12 DIAGNOSIS — R74.01 ELEVATION OF LEVELS OF LIVER TRANSAMINASE LEVELS: ICD-10-CM

## 2020-11-12 DIAGNOSIS — Z85.3 PERSONAL HISTORY OF MALIGNANT NEOPLASM OF BREAST: ICD-10-CM

## 2020-11-12 DIAGNOSIS — Z91.018 ALLERGY TO OTHER FOODS: ICD-10-CM

## 2020-11-12 DIAGNOSIS — R60.0 LOCALIZED EDEMA: ICD-10-CM

## 2020-11-12 DIAGNOSIS — R06.02 SHORTNESS OF BREATH: ICD-10-CM

## 2020-11-15 ENCOUNTER — EMERGENCY (EMERGENCY)
Facility: HOSPITAL | Age: 75
LOS: 1 days | Discharge: HOME | End: 2020-11-15
Attending: EMERGENCY MEDICINE | Admitting: EMERGENCY MEDICINE
Payer: MEDICARE

## 2020-11-15 VITALS
OXYGEN SATURATION: 98 % | HEART RATE: 70 BPM | TEMPERATURE: 98 F | DIASTOLIC BLOOD PRESSURE: 64 MMHG | RESPIRATION RATE: 18 BRPM | SYSTOLIC BLOOD PRESSURE: 111 MMHG

## 2020-11-15 VITALS
HEIGHT: 65 IN | TEMPERATURE: 99 F | HEART RATE: 61 BPM | SYSTOLIC BLOOD PRESSURE: 104 MMHG | RESPIRATION RATE: 17 BRPM | OXYGEN SATURATION: 97 % | DIASTOLIC BLOOD PRESSURE: 61 MMHG | WEIGHT: 121.92 LBS

## 2020-11-15 DIAGNOSIS — R07.9 CHEST PAIN, UNSPECIFIED: ICD-10-CM

## 2020-11-15 DIAGNOSIS — J93.9 PNEUMOTHORAX, UNSPECIFIED: ICD-10-CM

## 2020-11-15 LAB
ALBUMIN SERPL ELPH-MCNC: 3.7 G/DL — SIGNIFICANT CHANGE UP (ref 3.4–5)
ALP SERPL-CCNC: 82 U/L — SIGNIFICANT CHANGE UP (ref 40–120)
ALT FLD-CCNC: 31 U/L — SIGNIFICANT CHANGE UP (ref 12–42)
ANION GAP SERPL CALC-SCNC: 9 MMOL/L — SIGNIFICANT CHANGE UP (ref 9–16)
AST SERPL-CCNC: 18 U/L — SIGNIFICANT CHANGE UP (ref 15–37)
BASOPHILS # BLD AUTO: 0.04 K/UL — SIGNIFICANT CHANGE UP (ref 0–0.2)
BASOPHILS NFR BLD AUTO: 0.5 % — SIGNIFICANT CHANGE UP (ref 0–2)
BILIRUB SERPL-MCNC: 0.3 MG/DL — SIGNIFICANT CHANGE UP (ref 0.2–1.2)
BUN SERPL-MCNC: 11 MG/DL — SIGNIFICANT CHANGE UP (ref 7–23)
CALCIUM SERPL-MCNC: 9.7 MG/DL — SIGNIFICANT CHANGE UP (ref 8.5–10.5)
CHLORIDE SERPL-SCNC: 104 MMOL/L — SIGNIFICANT CHANGE UP (ref 96–108)
CO2 SERPL-SCNC: 26 MMOL/L — SIGNIFICANT CHANGE UP (ref 22–31)
CREAT SERPL-MCNC: 0.49 MG/DL — LOW (ref 0.5–1.3)
CULTURE RESULTS: SIGNIFICANT CHANGE UP
CULTURE RESULTS: SIGNIFICANT CHANGE UP
EOSINOPHIL # BLD AUTO: 0.02 K/UL — SIGNIFICANT CHANGE UP (ref 0–0.5)
EOSINOPHIL NFR BLD AUTO: 0.3 % — SIGNIFICANT CHANGE UP (ref 0–6)
GLUCOSE SERPL-MCNC: 106 MG/DL — HIGH (ref 70–99)
HCT VFR BLD CALC: 34.7 % — SIGNIFICANT CHANGE UP (ref 34.5–45)
HGB BLD-MCNC: 11.4 G/DL — LOW (ref 11.5–15.5)
IMM GRANULOCYTES NFR BLD AUTO: 2 % — HIGH (ref 0–1.5)
LYMPHOCYTES # BLD AUTO: 2.01 K/UL — SIGNIFICANT CHANGE UP (ref 1–3.3)
LYMPHOCYTES # BLD AUTO: 25.5 % — SIGNIFICANT CHANGE UP (ref 13–44)
MCHC RBC-ENTMCNC: 30.1 PG — SIGNIFICANT CHANGE UP (ref 27–34)
MCHC RBC-ENTMCNC: 32.9 GM/DL — SIGNIFICANT CHANGE UP (ref 32–36)
MCV RBC AUTO: 91.6 FL — SIGNIFICANT CHANGE UP (ref 80–100)
MONOCYTES # BLD AUTO: 1.66 K/UL — HIGH (ref 0–0.9)
MONOCYTES NFR BLD AUTO: 21.1 % — HIGH (ref 2–14)
NEUTROPHILS # BLD AUTO: 3.98 K/UL — SIGNIFICANT CHANGE UP (ref 1.8–7.4)
NEUTROPHILS NFR BLD AUTO: 50.6 % — SIGNIFICANT CHANGE UP (ref 43–77)
NRBC # BLD: 0 /100 WBCS — SIGNIFICANT CHANGE UP (ref 0–0)
PLATELET # BLD AUTO: 276 K/UL — SIGNIFICANT CHANGE UP (ref 150–400)
POTASSIUM SERPL-MCNC: 4.1 MMOL/L — SIGNIFICANT CHANGE UP (ref 3.5–5.3)
POTASSIUM SERPL-SCNC: 4.1 MMOL/L — SIGNIFICANT CHANGE UP (ref 3.5–5.3)
PROT SERPL-MCNC: 6.9 G/DL — SIGNIFICANT CHANGE UP (ref 6.4–8.2)
RBC # BLD: 3.79 M/UL — LOW (ref 3.8–5.2)
RBC # FLD: 12.9 % — SIGNIFICANT CHANGE UP (ref 10.3–14.5)
SODIUM SERPL-SCNC: 139 MMOL/L — SIGNIFICANT CHANGE UP (ref 132–145)
SPECIMEN SOURCE: SIGNIFICANT CHANGE UP
SPECIMEN SOURCE: SIGNIFICANT CHANGE UP
TROPONIN I SERPL-MCNC: <0.017 NG/ML — LOW (ref 0.02–0.06)
WBC # BLD: 7.87 K/UL — SIGNIFICANT CHANGE UP (ref 3.8–10.5)
WBC # FLD AUTO: 7.87 K/UL — SIGNIFICANT CHANGE UP (ref 3.8–10.5)

## 2020-11-15 PROCEDURE — 71250 CT THORAX DX C-: CPT | Mod: 26

## 2020-11-15 PROCEDURE — 99285 EMERGENCY DEPT VISIT HI MDM: CPT

## 2020-11-15 PROCEDURE — 93010 ELECTROCARDIOGRAM REPORT: CPT

## 2020-11-15 RX ORDER — ASPIRIN/CALCIUM CARB/MAGNESIUM 324 MG
162 TABLET ORAL ONCE
Refills: 0 | Status: COMPLETED | OUTPATIENT
Start: 2020-11-15 | End: 2020-11-15

## 2020-11-15 RX ORDER — GABAPENTIN 400 MG/1
1 CAPSULE ORAL
Qty: 30 | Refills: 0
Start: 2020-11-15 | End: 2020-11-25

## 2020-11-15 RX ORDER — KETOROLAC TROMETHAMINE 30 MG/ML
30 SYRINGE (ML) INJECTION ONCE
Refills: 0 | Status: DISCONTINUED | OUTPATIENT
Start: 2020-11-15 | End: 2020-11-15

## 2020-11-15 RX ORDER — GABAPENTIN 400 MG/1
1 CAPSULE ORAL
Qty: 30 | Refills: 0
Start: 2020-11-15 | End: 2020-11-24

## 2020-11-15 RX ORDER — GABAPENTIN 400 MG/1
300 CAPSULE ORAL ONCE
Refills: 0 | Status: COMPLETED | OUTPATIENT
Start: 2020-11-15 | End: 2020-11-15

## 2020-11-15 RX ADMIN — Medication 162 MILLIGRAM(S): at 16:27

## 2020-11-15 RX ADMIN — GABAPENTIN 300 MILLIGRAM(S): 400 CAPSULE ORAL at 16:27

## 2020-11-15 RX ADMIN — Medication 30 MILLIGRAM(S): at 16:27

## 2020-11-15 NOTE — ED ADULT TRIAGE NOTE - CHIEF COMPLAINT QUOTE
Pt walked into ED c.o right chest pain that radiates to riht shoulder and arm. Pt states "I was admitted for a pnuemothorax after having an ablation at [place] last week. Thursday I started having pain in my chest and arm which is getting worse. I think I have another pnuemothorax". Pt denies f/chills at this time.

## 2020-11-15 NOTE — ED ADULT NURSE NOTE - CHIEF COMPLAINT QUOTE
Pt walked into ED c.o right chest pain that radiates to riht shoulder and arm. Pt states "I was admitted for a pnuemothorax after having an ablation at [facility] last week. Thursday I started having pain in my chest and arm which is getting worse. I think I have another pnuemothorax". Pt denies f/chills at this time.

## 2020-11-15 NOTE — ED PROVIDER NOTE - NSFOLLOWUPINSTRUCTIONS_ED_ALL_ED_FT
Chest Pain    WHAT YOU NEED TO KNOW:    Chest pain can be caused by a range of conditions, from not serious to life-threatening. Chest pain can be a symptom of a digestive problem, such as acid reflux or a stomach ulcer. An anxiety attack or a strong emotion, such as anger, can also cause chest pain. Infection, inflammation, or a fracture in the bones or cartilage in your chest can cause pain or discomfort. Sometimes chest pain or pressure is caused by poor blood flow to your heart (angina). Chest pain may also be caused by life-threatening conditions such as a heart attack or blood clot in your lungs.     DISCHARGE INSTRUCTIONS:    Call 911 if:   •You have any of the following signs of a heart attack: ?Squeezing, pressure, or pain in your chest      ?You may also have any of the following: ?Discomfort or pain in your back, neck, jaw, stomach, or arm      ?Shortness of breath      ?Nausea or vomiting      ?Lightheadedness or a sudden cold sweat            Return to the emergency department if:   •You have chest discomfort that gets worse, even with medicine.      •You cough or vomit blood.       •Your bowel movements are black or bloody.       •You cannot stop vomiting, or it hurts to swallow.       Contact your healthcare provider if:   •You have questions or concerns about your condition or care.          Medicines:   •Medicines may be given to treat the cause of your chest pain. Examples include pain medicine, anxiety medicine, or medicines to increase blood flow to your heart.       •Do not take certain medicines without asking your healthcare provider first. These include NSAIDs, herbal or vitamin supplements, or hormones (estrogen or progestin).       •Take your medicine as directed. Contact your healthcare provider if you think your medicine is not helping or if you have side effects. Tell him or her if you are allergic to any medicine. Keep a list of the medicines, vitamins, and herbs you take. Include the amounts, and when and why you take them. Bring the list or the pill bottles to follow-up visits. Carry your medicine list with you in case of an emergency.      Follow up with your healthcare provider within 72 hours, or as directed: You may need to return for more tests to find the cause of your chest pain. You may be referred to a specialist, such as a cardiologist or gastroenterologist. Write down your questions so you remember to ask them during your visits.     Healthy living tips: The following are general healthy guidelines. If your chest pain is caused by a heart problem, your healthcare provider will give you specific guidelines to follow.  •Do not smoke. Nicotine and other chemicals in cigarettes and cigars can cause lung and heart damage. Ask your healthcare provider for information if you currently smoke and need help to quit. E-cigarettes or smokeless tobacco still contain nicotine. Talk to your healthcare provider before you use these products.       •Eat a variety of healthy, low-fat, low-salt foods. Healthy foods include fruits, vegetables, whole-grain breads, low-fat dairy products, beans, lean meats, and fish. Ask for more information about a heart healthy diet.      •Drink plenty of water every day. Your body is made of mostly water. Water helps your body to control your temperature and blood pressure. Ask your healthcare provider how much water you should drink every day.      •Ask about activity. Your healthcare provider will tell you which activities to limit or avoid. Ask when you can drive, return to work, and have sex. Ask about the best exercise plan for you.      •Maintain a healthy weight. Ask your healthcare provider how much you should weigh. Ask him or her to help you create a weight loss plan if you are overweight.       •Get the flu and pneumonia vaccines. All adults should get the influenza (flu) vaccine. Get it every year as soon as it becomes available. The pneumococcal vaccine is given to adults aged 65 years or older. The vaccine is given every 5 years to prevent pneumococcal disease, such as pneumonia.      If you have a stent:   •Carry your stent card with you at all times.       •Let all healthcare providers know that you have a stent. YOUR RIGHT SIDED PNEUMOTHORAX HAS ALMOST COMPLETELY RESOLVED. THE VERY SMALL AMOUNT OF AIR STILL AT THE APEX OF YOUR CHEST WALL IS LIKELY THE SOURCE OF YOUR PERSISTENT PAIN. PLEASE CONTINUE WITH THE TWO MEDICATIONS PRESCRIBED, GABAPENTIN AND IBUPROFEN, FOR PAIN CONTROL AND FOLLOW UP THIS WEEK WITH YOUR SURGEON FOR FURTHER MANAGEMENT. AT THIS TIME, YOU DO NOT NEED A CHEST TUBE. WITH TIME, THIS SHOULD RESOLVE. HOWEVER, IF YOUR PAIN BECOMES ACUTELY WORSE YOU DEVELOP WORSENING SHORTNESS OF BREATH, PLEASE RETURN TO THE EMERGENCY DEPARTMENT.    Chest Pain    WHAT YOU NEED TO KNOW:    Chest pain can be caused by a range of conditions, from not serious to life-threatening. Chest pain can be a symptom of a digestive problem, such as acid reflux or a stomach ulcer. An anxiety attack or a strong emotion, such as anger, can also cause chest pain. Infection, inflammation, or a fracture in the bones or cartilage in your chest can cause pain or discomfort. Sometimes chest pain or pressure is caused by poor blood flow to your heart (angina). Chest pain may also be caused by life-threatening conditions such as a heart attack or blood clot in your lungs.     DISCHARGE INSTRUCTIONS:    Call 911 if:   •You have any of the following signs of a heart attack: ?Squeezing, pressure, or pain in your chest      ?You may also have any of the following: ?Discomfort or pain in your back, neck, jaw, stomach, or arm      ?Shortness of breath      ?Nausea or vomiting      ?Lightheadedness or a sudden cold sweat            Return to the emergency department if:   •You have chest discomfort that gets worse, even with medicine.      •You cough or vomit blood.       •Your bowel movements are black or bloody.       •You cannot stop vomiting, or it hurts to swallow.       Contact your healthcare provider if:   •You have questions or concerns about your condition or care.          Medicines:   •Medicines may be given to treat the cause of your chest pain. Examples include pain medicine, anxiety medicine, or medicines to increase blood flow to your heart.       •Do not take certain medicines without asking your healthcare provider first. These include NSAIDs, herbal or vitamin supplements, or hormones (estrogen or progestin).       •Take your medicine as directed. Contact your healthcare provider if you think your medicine is not helping or if you have side effects. Tell him or her if you are allergic to any medicine. Keep a list of the medicines, vitamins, and herbs you take. Include the amounts, and when and why you take them. Bring the list or the pill bottles to follow-up visits. Carry your medicine list with you in case of an emergency.      Follow up with your healthcare provider within 72 hours, or as directed: You may need to return for more tests to find the cause of your chest pain. You may be referred to a specialist, such as a cardiologist or gastroenterologist. Write down your questions so you remember to ask them during your visits.     Healthy living tips: The following are general healthy guidelines. If your chest pain is caused by a heart problem, your healthcare provider will give you specific guidelines to follow.  •Do not smoke. Nicotine and other chemicals in cigarettes and cigars can cause lung and heart damage. Ask your healthcare provider for information if you currently smoke and need help to quit. E-cigarettes or smokeless tobacco still contain nicotine. Talk to your healthcare provider before you use these products.       •Eat a variety of healthy, low-fat, low-salt foods. Healthy foods include fruits, vegetables, whole-grain breads, low-fat dairy products, beans, lean meats, and fish. Ask for more information about a heart healthy diet.      •Drink plenty of water every day. Your body is made of mostly water. Water helps your body to control your temperature and blood pressure. Ask your healthcare provider how much water you should drink every day.      •Ask about activity. Your healthcare provider will tell you which activities to limit or avoid. Ask when you can drive, return to work, and have sex. Ask about the best exercise plan for you.      •Maintain a healthy weight. Ask your healthcare provider how much you should weigh. Ask him or her to help you create a weight loss plan if you are overweight.       •Get the flu and pneumonia vaccines. All adults should get the influenza (flu) vaccine. Get it every year as soon as it becomes available. The pneumococcal vaccine is given to adults aged 65 years or older. The vaccine is given every 5 years to prevent pneumococcal disease, such as pneumonia.      If you have a stent:   •Carry your stent card with you at all times.       •Let all healthcare providers know that you have a stent.

## 2020-11-15 NOTE — ED ADULT NURSE NOTE - OBJECTIVE STATEMENT
74 y/o female c/o right shoulder pain radiating to right arm- states she was recently admitted for pneumothorax late october - denies CP, SOB

## 2020-11-15 NOTE — ED PROVIDER NOTE - CLINICAL SUMMARY MEDICAL DECISION MAKING FREE TEXT BOX
Negative workup for ACS, PTX, electrolyte disturbance, anemia. Likely persistent referred pain from procedure/known R upper lobe malignancy. Started on gabapentin + nsaids. Has f/u with surgeon tomorrow. d/c home with return precautions and anticipatory guidance.

## 2020-11-15 NOTE — ED PROVIDER NOTE - PATIENT PORTAL LINK FT
You can access the FollowMyHealth Patient Portal offered by [facility] by registering at the following website: [email or web address]. By joining [facility]’s FollowMyHealth portal, you will also be able to view your health information using other applications (apps) compatible with our system.

## 2020-11-15 NOTE — ED PROVIDER NOTE - CARE PLAN
Principal Discharge DX:	Chest pain, unspecified type   Principal Discharge DX:	Chest pain, unspecified type  Secondary Diagnosis:	Pneumothorax on right

## 2020-11-15 NOTE — ED PROVIDER NOTE - OBJECTIVE STATEMENT
76 y/o F with PMHx of breast cancer, skin cancer, brain aneurysm, and adenocarcinoma of the right lung s/p microwave ablation with subsequent right PTX treated with chest tube (removed 11/07/20) at [facility] on 10/29/20 and d/c 9 days ago. Patient was last seen at [facility] 7 days ago for right-sided chest tightness radiating to back and dyspnea on exertion and was found to have a R apical PTX on CTA. Arrives today w persistent R shoulder and R upper extremity stabbing pain that is mildly positional w some pleuritic component but at baseline. Denies painful breathing, SOB, new trauma, falls, swelling, redness/rash, cough, fever, chills. Pt reports taking Tylenol for pain w/o relief. Called her surgeon who recommended she be evaluated for recurrence of PTX.

## 2020-11-16 ENCOUNTER — TRANSCRIPTION ENCOUNTER (OUTPATIENT)
Age: 75
End: 2020-11-16

## 2020-11-16 DIAGNOSIS — Z71.89 OTHER SPECIFIED COUNSELING: ICD-10-CM

## 2020-11-16 RX ORDER — GABAPENTIN 400 MG/1
1 CAPSULE ORAL
Qty: 30 | Refills: 0
Start: 2020-11-16

## 2020-11-16 RX ORDER — ACETAMINOPHEN 500 MG
2 TABLET ORAL
Qty: 60 | Refills: 0
Start: 2020-11-16

## 2020-12-07 ENCOUNTER — APPOINTMENT (OUTPATIENT)
Dept: PULMONOLOGY | Facility: CLINIC | Age: 75
End: 2020-12-07
Payer: MEDICARE

## 2020-12-07 VITALS
SYSTOLIC BLOOD PRESSURE: 106 MMHG | DIASTOLIC BLOOD PRESSURE: 80 MMHG | BODY MASS INDEX: 20.83 KG/M2 | OXYGEN SATURATION: 98 % | WEIGHT: 125 LBS | TEMPERATURE: 97.3 F | HEART RATE: 70 BPM | HEIGHT: 65 IN

## 2020-12-07 DIAGNOSIS — Z80.1 FAMILY HISTORY OF MALIGNANT NEOPLASM OF TRACHEA, BRONCHUS AND LUNG: ICD-10-CM

## 2020-12-07 DIAGNOSIS — Z85.3 PERSONAL HISTORY OF MALIGNANT NEOPLASM OF BREAST: ICD-10-CM

## 2020-12-07 DIAGNOSIS — C34.91 MALIGNANT NEOPLASM OF UNSPECIFIED PART OF RIGHT BRONCHUS OR LUNG: ICD-10-CM

## 2020-12-07 DIAGNOSIS — Z87.891 PERSONAL HISTORY OF NICOTINE DEPENDENCE: ICD-10-CM

## 2020-12-07 DIAGNOSIS — J95.811 POSTPROCEDURAL PNEUMOTHORAX: ICD-10-CM

## 2020-12-07 DIAGNOSIS — Z86.39 PERSONAL HISTORY OF OTHER ENDOCRINE, NUTRITIONAL AND METABOLIC DISEASE: ICD-10-CM

## 2020-12-07 DIAGNOSIS — Z86.79 PERSONAL HISTORY OF OTHER DISEASES OF THE CIRCULATORY SYSTEM: ICD-10-CM

## 2020-12-07 PROCEDURE — 99204 OFFICE O/P NEW MOD 45 MIN: CPT

## 2020-12-07 RX ORDER — GABAPENTIN 100 MG/1
100 CAPSULE ORAL
Qty: 180 | Refills: 0 | Status: DISCONTINUED | COMMUNITY
Start: 2020-11-24 | End: 2020-12-07

## 2020-12-07 RX ORDER — NAPROXEN 500 MG/1
500 TABLET ORAL
Qty: 20 | Refills: 0 | Status: DISCONTINUED | COMMUNITY
Start: 2020-11-16 | End: 2020-12-07

## 2020-12-07 NOTE — ASSESSMENT
[FreeTextEntry1] : Data reviewed:\par \par CT chest [facility] 11/9/20 and 11/15/20 both personally reviewed and show decreasing small R apical pneumo as well as a large area of consolidation at the R apex presumably representing tumor and inflammation from microwave ablation\par \par Impression:\par Lung cancer s/p microwave ablation 10/2020 [facility]\par R pneumothorax, stable/improving\par \par Plan:\par Not much for me to do here.\par The available imaging shows a small and improving ptx. She has no new symptoms and declines a follow up CXR today.\par She does not want to see an oncologist at this time. I assume her tumor was stage I but I don't know this. She declined to wait for us to copy her records for review. I think she should see an oncologist, and should probably have a follow up CT in 3 mos. She may accept the follow up CT. I asked her to call me if so, in Feb. She is clearly savvy and reads as documented by seeking out microwave ablation for her tumor.\par She does wish to have a new internist at [facility] and I gave her that referral.\par She declines flu vaccine.

## 2020-12-07 NOTE — PHYSICAL EXAM
[No Acute Distress] : no acute distress [Normal Rate/Rhythm] : normal rate/rhythm [Normal S1, S2] : normal s1, s2 [No Murmurs] : no murmurs [No Resp Distress] : no resp distress [Clear to Auscultation Bilaterally] : clear to auscultation bilaterally [Benign] : benign [No Clubbing] : no clubbing [No Edema] : no edema [Normal Color/ Pigmentation] : normal color/ pigmentation [Normal Affect] : normal affect

## 2020-12-07 NOTE — HISTORY OF PRESENT ILLNESS
[TextBox_4] : 12/07/2020: Hospital follow up for this 74yo woman with history of IIB lung adenocarcinoma (R) s/p microwave ablation 10/29 complicated by R ptx managed w chest tube on 11/3 w subsequent imaging notable for stable small R apical ptx. She also reports a brachial plexus injury. Is not dyspneic. Is winded on stairs. Seeing a naturopath now. The original lung bx was at [facility]. She was then followed for some time at [facility] where lobectomy was recommended and she declined. She sought out the microwave ablation. She saw Dr Arun Cha at [facility] who referred her to Dr May at [facility]. Former smoker quit 41 years ago.

## 2020-12-22 ENCOUNTER — APPOINTMENT (OUTPATIENT)
Dept: INTERNAL MEDICINE | Facility: CLINIC | Age: 75
End: 2020-12-22

## 2020-12-23 NOTE — ED ADULT TRIAGE NOTE - NSTRIAGECARE_GEN_A_ER
[address] Patient Status:  Inpatient    3/24/1948 MRN [id number]   [facility] 4SW-A Attending Jenny Rodriguez MD   [address] PCP Shirl Collet, MD     Date of Consult: / Surgical History:   Procedure Laterality Date   • BICEPS TENDON REPAIR Left 10/11/2018    Performed by Tre Amin MD at [facility] MAIN OR   • [address]  12/2017    right   • FEMUR IM NAIL Right 12/2/2017    Performed by Danii Almanza MD at [facility] PRN **OR** dextrose 50 % injection 50 mL, 50 mL, Intravenous, Q15 Min PRN **OR** glucose (DEX4) oral liquid 30 g, 30 g, Oral, Q15 Min PRN **OR** Glucose-Vitamin C (DEX-4) chewable tab 8 tablet, 8 tablet, Oral, Q15 Min PRN  •  Insulin Aspart Pen (NOVOLOG) 1 (TYLENOL) tab 650 mg, 650 mg, Oral, Q6H PRN **OR** acetaminophen (TYLENOL) 160 MG/5ML oral liquid 650 mg, 650 mg, Oral, Q6H PRN **OR** acetaminophen (TYLENOL) 650 MG rectal suppository 650 mg, 650 mg, Rectal, Q6H PRN  •  fentanyl (SUBLIMAZE) 1000 mcg/100 m 12/23/20  0521   CRP 22.10* 26.00* 24.30*   VITALY 207.3 223.3 241.8   * 333* 340*   DDIMER 2.61* 2.75* 3.96*       Recent Labs   Lab 12/22/20 0520 12/22/20  0521 12/23/20 0521   WBC 5.2  --  8.1   LDH  --  333* 340*   DDIMER  --  2.75* 3.96*   CRP disease  Can't perform job Full Normal or   Reduced Full   60 Reduced Significant disease  Can't perform hobby Occasional  Assist Normal or   Reduced Full or confused   50 Mainly sit/lie Extensive Disease  Can't do any work Partial Assist Normal or Reduced management is no longer pursued, or felt to not contribute to a person's QOL. We discussed the concern regarding trajectory of disease process and current wishes, and acknowledged the importance of GOC/ACP discussions in advance of times of crisis. Face Mask/EKG wishes and current treatments. Code Status / POLST: DNAR/Full Treatment. Confirmed w spouse and son on 12/23. Currently intubated and on pressors. POLST deferred as GOC are ongoing. HCPOA:  Document on file Yes [x]  No [].  Requested for file []  Healt Other chronic pain     Aorto-iliac atherosclerosis (HCC)     COPD (chronic obstructive pulmonary disease) (HCC)     Pressure injury of skin of buttock     Dementia associated with other underlying disease without behavioral disturbance (HCC)     Pain of ri

## 2021-01-14 ENCOUNTER — APPOINTMENT (OUTPATIENT)
Dept: FAMILY MEDICINE | Facility: CLINIC | Age: 76
End: 2021-01-14

## 2021-02-01 PROCEDURE — G9005: CPT

## 2021-03-01 NOTE — ED ADULT NURSE NOTE - ED CARDIAC RATE
Patient Name: Millie Richardson  MRN: [id number]   :  1945     Chief Complaint:      ICD-10-CM ICD-9-CM   1. Major depression, recurrent, chronic (CMS/HCC)  F33.9 296.30   2. Psychophysiological insomnia  F51.04 307.42   3. Generalized anxiety disorder  F41.1 300.02       History of Present Illness: Millie Richardson is a 75 y.o. female is here today for medication management follow up.  Patient states she has been resting well.  States her anxiety and depression are up and down.  Patient states on average she has 2 good days a week.  Patient does not want to adjust medication right now.  She wants to continue working with therapy    The following portions of the patient's history were reviewed and updated as appropriate: allergies, current medications, past family history, past medical history, past social history, past surgical history and problem list.    Review of Systems;;  Review of Systems   Constitutional: Negative for activity change, appetite change, fatigue, unexpected weight gain and unexpected weight loss.   Respiratory: Negative for shortness of breath and wheezing.    Gastrointestinal: Negative for constipation, diarrhea, nausea and vomiting.   Musculoskeletal: Negative for gait problem.   Skin: Negative for dry skin and rash.   Neurological: Negative for dizziness, speech difficulty, weakness, light-headedness, headache, memory problem and confusion.   Psychiatric/Behavioral: Positive for depressed mood. Negative for agitation, behavioral problems, decreased concentration, dysphoric mood, hallucinations, self-injury, sleep disturbance, suicidal ideas, negative for hyperactivity and stress. The patient is nervous/anxious.          not currently breastfeeding.  There is no height or weight on file to calculate BMI.    Current Medications;;    Current Outpatient Medications:   •  amitriptyline (ELAVIL) 25 MG tablet, 1-2 po q hs, Disp: 60 tablet, Rfl: 3  •  amLODIPine (NORVASC) 5 MG tablet, Take 1 tablet by  mouth Daily., Disp: 90 tablet, Rfl: 3  •  buPROPion XL (Wellbutrin XL) 150 MG 24 hr tablet, One po daily With 300mg, Disp: 30 tablet, Rfl: 2  •  buPROPion XL (Wellbutrin XL) 300 MG 24 hr tablet, Take 1 tablet by mouth Daily., Disp: 30 tablet, Rfl: 5  •  Eliquis 5 MG tablet tablet, Take 1 tablet by mouth Every 12 (Twelve) Hours., Disp: 180 tablet, Rfl: 1  •  lisinopril (PRINIVIL,ZESTRIL) 40 MG tablet, Take 1 tablet by mouth Daily., Disp: 90 tablet, Rfl: 1  •  metoprolol succinate XL (TOPROL-XL) 200 MG 24 hr tablet, Take 1 tablet by mouth Daily., Disp: 90 tablet, Rfl: 1  •  Misc. Devices (Bath/Shower Seat) misc, 1 Units As Needed (bathing)., Disp: 1 each, Rfl: 0  •  Misc. Devices (Rollator Ultra-Light) misc, 1 Units As Needed (ambulation assistance)., Disp: 1 each, Rfl: 0  •  NP Thyroid 30 MG tablet, Take 1.5 tablets by mouth Daily., Disp: 135 tablet, Rfl: 1  •  pantoprazole (PROTONIX) 40 MG EC tablet, Take 1 tablet by mouth Daily., Disp: 90 tablet, Rfl: 1  •  potassium chloride (K-DUR,KLOR-CON) 10 MEQ CR tablet, Take 1 tablet by mouth Daily., Disp: 30 tablet, Rfl: 3  •  venlafaxine (EFFEXOR) 100 MG tablet, Take 1 tablet by mouth 2 (Two) Times a Day., Disp: 180 tablet, Rfl: 1    Lab Results:   No visits with results within 3 Month(s) from this visit.   Latest known visit with results is:   Office Visit on 09/28/2020   Component Date Value Ref Range Status   • TSH 09/28/2020 3.370  0.450 - 4.500 uIU/mL Final   • Free T4 09/28/2020 0.88  0.82 - 1.77 ng/dL Final       Mental Status Exam:   Hygiene:   tele  Cooperation:  Cooperative  Eye Contact:  tele  Psychomotor Behavior:  tele  Mood:anxious and depressed  Affect:  Appropriate  Hopelessness: Denies  Speech:  Normal  Thought Process:  Goal directed  Thought Content:  Normal  Suicidal:  None  Homicidal:  None  Hallucinations:  None  Delusion:  None  Memory:  Intact  Orientation:  Person, Place, Time and Situation  Reliability:  good  Insight:  Good  Judgement:   Good  Impulse Control:  Good  Physical/Medical Issues:  No     PHQ-9 Depression Screening  Little interest or pleasure in doing things? 3   Feeling down, depressed, or hopeless? 3   Trouble falling or staying asleep, or sleeping too much? 0   Feeling tired or having little energy? 3   Poor appetite or overeating? 3   Feeling bad about yourself - or that you are a failure or have let yourself or your family down? 0   Trouble concentrating on things, such as reading the newspaper or watching television? 3   Moving or speaking so slowly that other people could have noticed? Or the opposite - being so fidgety or restless that you have been moving around a lot more than usual? 0   Thoughts that you would be better off dead, or of hurting yourself in some way? 0   PHQ-9 Total Score 15   If you checked off any problems, how difficult have these problems made it for you to do your work, take care of things at home, or get along with other people?          Assessment/Plan:  Diagnoses and all orders for this visit:    1. Major depression, recurrent, chronic (CMS/HCC) (Primary)  -     buPROPion XL (Wellbutrin XL) 150 MG 24 hr tablet; One po daily With 300mg  Dispense: 30 tablet; Refill: 2  -     venlafaxine (EFFEXOR) 100 MG tablet; Take 1 tablet by mouth 2 (Two) Times a Day.  Dispense: 180 tablet; Refill: 1  -     buPROPion XL (Wellbutrin XL) 300 MG 24 hr tablet; Take 1 tablet by mouth Daily.  Dispense: 30 tablet; Refill: 5    2. Psychophysiological insomnia  -     amitriptyline (ELAVIL) 25 MG tablet; 1-2 po q hs  Dispense: 60 tablet; Refill: 3    3. Generalized anxiety disorder  -     venlafaxine (EFFEXOR) 100 MG tablet; Take 1 tablet by mouth 2 (Two) Times a Day.  Dispense: 180 tablet; Refill: 1      Medication is currently ordered.  Patient wants to give therapy more time before she considers medication adjustment.  Appointment start time 216.  End time 227.    A psychological evaluation was conducted in order to assess  past and current level of functioning. Areas assessed included, but were not limited to: perception of social support, perception of ability to face and deal with challenges in life (positive functioning), anxiety symptoms, depressive symptoms, perspective on beliefs/belief system, coping skills for stress, intelligence level,  Therapeutic rapport was established. Interventions conducted today were geared towards incorporating medication management along with support for continued therapy. Education was also provided as to the med management with this provider and what to expect in subsequent sessions.    We discussed risks, benefits,goals and side effects of the above medication and the patient was agreeable with the plan.Patient was educated on the importance of compliance with treatment and follow-up appointments. Patient is aware to contact the [facility] with any worsening of symptoms. To call for questions or concerns and return early if necessary. Patent is agreeable to go to the Emergency Department or call 911 should they begin SI/HI.     Treatment Plan:   Discussed risks, benefits, and alternatives of medication. Encouraged healthy habits (eating, exercise and sleep). Call if any questions or problems arise. Medication reconciled. Controlled substance monitoring report reviewed. Provided psychoeducation.. Discussed coping strategies and current stressors. Set appropriate boundaries and limits for patient's well-being. Use distraction techniques to improve symptoms. Access support networks.      Return in about 2 months (around 5/1/2021) for Follow Up 15 min.    MARIA L Reyes   normal

## 2021-05-25 ENCOUNTER — TRANSCRIPTION ENCOUNTER (OUTPATIENT)
Age: 76
End: 2021-05-25

## 2022-10-04 NOTE — ED ADULT NURSE NOTE - TEMPLATE LIST FOR HEAD TO TOE ASSESSMENT
General O-T Advancement Flap Text: The defect edges were debeveled with a #15 scalpel blade.  Given the location of the defect, shape of the defect and the proximity to free margins an O-T advancement flap was deemed most appropriate.  Using a sterile surgical marker, an appropriate advancement flap was drawn incorporating the defect and placing the expected incisions within the relaxed skin tension lines where possible.    The area thus outlined was incised deep to adipose tissue with a #15 scalpel blade.  The skin margins were undermined to an appropriate distance in all directions utilizing iris scissors.

## 2023-06-29 NOTE — PATIENT PROFILE ADULT - FOOD INSECURITY
What Type Of Note Output Would You Prefer (Optional)?: Standard Output What Is The Reason For Today's Visit?: Full Body Skin Examination with No Concerns What Is The Reason For Today's Visit? (Being Monitored For X): the re-examination of lesions previously examined no

## 2023-08-01 NOTE — PATIENT PROFILE ADULT - NSASFALLNEEDSASSIST_GEN_A_NUR
She is working on quitting smoking  She is not vaping and cutting down her cigarettes  Talked about weaning her nicotine amount in her vape until she can quit  Also talked about triggers and habit times and things she can do instead of smoking.   no

## 2023-10-10 NOTE — ED ADULT TRIAGE NOTE - ACCOMPANIED BY
Self Clofazimine Counseling:  I discussed with the patient the risks of clofazimine including but not limited to skin and eye pigmentation, liver damage, nausea/vomiting, gastrointestinal bleeding and allergy.

## 2024-05-14 NOTE — CONSULT NOTE ADULT - SUBJECTIVE AND OBJECTIVE BOX
Surgeon: Dr. Carpenter    Requesting Physician: Dr. Christie     HISTORY OF PRESENT ILLNESS:  75y female seen and examined with primary team. Patient states that she came to hospital after feeling weak this afternoon requiring her to sit every few steps. Patient states she also has persistent chest pain across her right chest and in her right shoulder and at previous pigtail site - unchanged from her prior hospitalization. Patient states that her breathing has been stable but did feel improved with supplemental oxygen in the emergency department. Patient denies dizziness, vision changes, palpitations, shortness of breath, cough, n/v/d, extremity swelling, calf tenderness, fever or chills.     CT Surgery consulted for Pneumothorax found on admission.     CXr with small right apical pneumothorax, CT chest confirms small right apical pneumothorax.       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 She states her symptoms are worse when she takes deep breaths, and somewhat improve when she is at rest. She states she did not want to return to [facility] because she was not happy with the care she received, so she decided to come here instead. She denies fevers/chills, Cough, palpitations, abd pain, n/v, diarhea.     In ED VS: T 99.8, HR 82, /44, RR 18 and SpO2 99% on RA. Labs s/f hgb 11.1, AST/ALT 82/106, BNP wnl, trops negative x1. CTA with no PE, Right upper lobe consolidative mass measuring 3.7 x 5.5 x 2.7 cm with adjacent ground glass nodular subcentimeter opacities and bronchial wall thickening; Right apical PTX with emphysemous changes c/w recent Chest tube placement. Pt given 1L NS bolus and tylenol x1."    PAST MEDICAL & SURGICAL HISTORY:  Breast cancer  Lung cancer        MEDICATIONS  (STANDING):  influenza  Vaccine (HIGH DOSE) 0.7 milliLiter(s) IntraMuscular once    MEDICATIONS  (PRN):  acetaminophen   Tablet .. 650 milliGRAM(s) Oral every 6 hours PRN Mild Pain (1 - 3)      Allergies  Gluten (Vomiting)  No Known Drug Allergies      Review of Systems (Need 10):  CONSTITUTIONAL: Denies fevers / chills, sweats, fatigue, weight loss, weight gain                                       NEURO:  Denies parathesias, seizures, syncope, confusion                                                                                  EYES:  Denies blurry vision, discharge, pain, loss of vision                                                                                    ENMT:  Denies difficulty hearing, vertigo, dysphagia, epistaxis, recent dental work                                       CV:  Denies chest pain, palpitations, ZUNIGA, orthopnea                                                                                           RESPIRATORY:  Denies wWheezing, SOB, cough / sputum, hemoptysis                                                               GI:  Denies nausea, vomiting, diarrhea, constipation, melena                                                                          : Denies hematuria, dysuria, urgency, incontinence                                                                                          MUSKULOSKELETAL:  Denies arthritis, joint swelling, muscle weakness                                                             SKIN/BREAST:  Denies rash, itching, hair loss, masses                                                                                              PSYCH:  Denies depression, anxiety, suicidal ideation                                                                                                HEME/LYMPH:  Denies bruises easily, enlarged lymph nodes, tender lymph nodes                                          ENDOCRINE:  Denies cold intolerance, heat intolerance, polydipsia                                                                      Vital Signs Last 24 Hrs  T(C): 36.7 (10 Nov 2020 01:16), Max: 37.7 (2020 17:49)  T(F): 98.1 (10 Nov 2020 01:16), Max: 99.8 (2020 17:49)  HR: 76 (10 Nov 2020 01:16) (67 - 82)  BP: 133/71 (10 Nov 2020 01:16) (105/44 - 133/71)  BP(mean): --  RR: 18 (10 Nov 2020 02:37) (16 - 18)  SpO2: 100% (10 Nov 2020 02:37) (96% - 100%)    Physical Exam (Need 8)  Neuro: A+O x 3, non-focal, speech clear and intact  HEENT: PERRL, EOMI, oral mucosa pink and moist  Neck: supple, no JVD  CV: regular rate, regular rhythm, +S1S2, no murmurs or rub  Pulm/chest: lung sounds CTA and equal bilaterally, no accessory muscle use noted  Abd: soft, NT, ND, +BS  Ext: LYNN x 4, no C/C/E  Skin: warm, well perfused, no rashes                                                           LABS:                        11.1   4.62  )-----------( 199      ( 2020 18:35 )             33.3         138  |  101  |  14  ----------------------------<  107<H>  4.5   |  28  |  0.60    Ca    9.7      2020 18:35    TPro  6.9  /  Alb  3.9  /  TBili  0.4  /  DBili  x   /  AST  82<H>  /  ALT  106<H>  /  AlkPhos  92      PT/INR - ( 2020 18:35 )   PT: 12.5 sec;   INR: 1.06          PTT - ( 2020 18:35 )  PTT:29.3 sec  Urinalysis Basic - ( 2020 23:23 )    Color: Yellow / Appearance: Clear / S.010 / pH: x  Gluc: x / Ketone: NEGATIVE  / Bili: NEGATIVE / Urobili: 0.2 E.U./dL   Blood: x / Protein: NEGATIVE mg/dL / Nitrite: NEGATIVE   Leuk Esterase: Small / RBC: < 5 /HPF / WBC 5-10 /HPF   Sq Epi: x / Non Sq Epi: 0-5 /HPF / Bacteria: Present /HPF      CARDIAC MARKERS ( 2020 18:35 )  <0.017 ng/mL / x     / x     / x     / x          RADIOLOGY & ADDITIONAL STUDIES:    CXR:  reviewed    CT Scan:  < from: CT Angio Chest PE Protocol w/ IV Cont (20 @ 19:40) >    IMPRESSION:    1. No pulmonary embolism.  2. Right upper lobe consolidative mass measuring 3.7 x 5.5 x 2.7 cm with adjacent ground glass nodular subcentimeter opacities and bronchial wall thickening. These findings are consistent with the patient's known history oflung carcinoma demonstrating lepidic growth. In addition, there are numerous groundglass nodules throughout the bilateral lungs some of which has a mosaic attenuation pattern to suggest an atypical pneumonia and/or air trapping especially in the leftupper lobe. Some of these findings may represent the sequela of recent microwave ablation.  3. Small right apical pneumothorax.  4. Anterior and lateral chest wall subcutaneous emphysema likely secondary to prior chest tube placement.  5. Surgical clip noted in the left breast with asymmetry of the breast parenchyma. Clinical and mammographic correlation is suggested.    < end of copied text > .

## 2024-10-09 ENCOUNTER — APPOINTMENT (OUTPATIENT)
Dept: HEART AND VASCULAR | Facility: CLINIC | Age: 79
End: 2024-10-09
Payer: MEDICARE

## 2024-10-09 ENCOUNTER — NON-APPOINTMENT (OUTPATIENT)
Age: 79
End: 2024-10-09

## 2024-10-09 VITALS
OXYGEN SATURATION: 97 % | BODY MASS INDEX: 21.66 KG/M2 | SYSTOLIC BLOOD PRESSURE: 115 MMHG | HEART RATE: 69 BPM | DIASTOLIC BLOOD PRESSURE: 69 MMHG | WEIGHT: 130 LBS | TEMPERATURE: 98 F | HEIGHT: 65 IN

## 2024-10-09 DIAGNOSIS — R60.9 EDEMA, UNSPECIFIED: ICD-10-CM

## 2024-10-09 PROCEDURE — 93000 ELECTROCARDIOGRAM COMPLETE: CPT

## 2024-10-09 PROCEDURE — 36415 COLL VENOUS BLD VENIPUNCTURE: CPT

## 2024-10-09 PROCEDURE — 99205 OFFICE O/P NEW HI 60 MIN: CPT

## 2024-10-09 RX ORDER — TEPOTINIB HYDROCHLORIDE 225 MG/1
225 TABLET ORAL
Refills: 0 | Status: ACTIVE | COMMUNITY

## 2024-10-10 LAB
ALBUMIN SERPL ELPH-MCNC: 4 G/DL
ALP BLD-CCNC: 83 U/L
ALT SERPL-CCNC: 13 U/L
ANION GAP SERPL CALC-SCNC: 11 MMOL/L
APPEARANCE: CLEAR
AST SERPL-CCNC: 18 U/L
BACTERIA: NEGATIVE /HPF
BASOPHILS # BLD AUTO: 0.01 K/UL
BASOPHILS NFR BLD AUTO: 0.2 %
BILIRUB SERPL-MCNC: 0.2 MG/DL
BILIRUBIN URINE: NEGATIVE
BLOOD URINE: NEGATIVE
BUN SERPL-MCNC: 15 MG/DL
CALCIUM OXALATE CRYSTALS: PRESENT
CALCIUM SERPL-MCNC: 9.1 MG/DL
CAST: 0 /LPF
CHLORIDE SERPL-SCNC: 103 MMOL/L
CHOLEST SERPL-MCNC: 137 MG/DL
CO2 SERPL-SCNC: 24 MMOL/L
COLOR: YELLOW
CREAT SERPL-MCNC: 0.85 MG/DL
CREAT SPEC-SCNC: 91 MG/DL
EGFR: 69 ML/MIN/1.73M2
EOSINOPHIL # BLD AUTO: 0 K/UL
EOSINOPHIL NFR BLD AUTO: 0 %
EPITHELIAL CELLS: 0 /HPF
ESTIMATED AVERAGE GLUCOSE: 108 MG/DL
GLUCOSE QUALITATIVE U: NEGATIVE MG/DL
GLUCOSE SERPL-MCNC: 93 MG/DL
HBA1C MFR BLD HPLC: 5.4 %
HCT VFR BLD CALC: 34.5 %
HDLC SERPL-MCNC: 40 MG/DL
HGB BLD-MCNC: 10.9 G/DL
IMM GRANULOCYTES NFR BLD AUTO: 0.8 %
KETONES URINE: NEGATIVE MG/DL
LDLC SERPL CALC-MCNC: 85 MG/DL
LEUKOCYTE ESTERASE URINE: NEGATIVE
LYMPHOCYTES # BLD AUTO: 1.88 K/UL
LYMPHOCYTES NFR BLD AUTO: 38.5 %
MAGNESIUM SERPL-MCNC: 2.3 MG/DL
MAN DIFF?: NORMAL
MCHC RBC-ENTMCNC: 30.4 PG
MCHC RBC-ENTMCNC: 31.6 GM/DL
MCV RBC AUTO: 96.4 FL
MICROALBUMIN 24H UR DL<=1MG/L-MCNC: <1.2 MG/DL
MICROALBUMIN/CREAT 24H UR-RTO: NORMAL MG/G
MICROSCOPIC-UA: NORMAL
MONOCYTES # BLD AUTO: 0.8 K/UL
MONOCYTES NFR BLD AUTO: 16.4 %
NEUTROPHILS # BLD AUTO: 2.15 K/UL
NEUTROPHILS NFR BLD AUTO: 44.1 %
NITRITE URINE: NEGATIVE
NONHDLC SERPL-MCNC: 96 MG/DL
PH URINE: 6
PLATELET # BLD AUTO: 155 K/UL
POTASSIUM SERPL-SCNC: 4.4 MMOL/L
PROT SERPL-MCNC: 6 G/DL
PROTEIN URINE: NEGATIVE MG/DL
RBC # BLD: 3.58 M/UL
RBC # FLD: 14.5 %
RED BLOOD CELLS URINE: 2 /HPF
REVIEW: NORMAL
SODIUM SERPL-SCNC: 138 MMOL/L
SPECIFIC GRAVITY URINE: 1.02
TRIGL SERPL-MCNC: 49 MG/DL
TSH SERPL-ACNC: 2.25 UIU/ML
UROBILINOGEN URINE: 0.2 MG/DL
WBC # FLD AUTO: 4.88 K/UL
WHITE BLOOD CELLS URINE: 0 /HPF

## 2024-10-11 ENCOUNTER — TRANSCRIPTION ENCOUNTER (OUTPATIENT)
Age: 79
End: 2024-10-11

## 2024-10-31 ENCOUNTER — APPOINTMENT (OUTPATIENT)
Dept: HEART AND VASCULAR | Facility: CLINIC | Age: 79
End: 2024-10-31

## 2024-12-10 ENCOUNTER — APPOINTMENT (OUTPATIENT)
Dept: OTOLARYNGOLOGY | Facility: CLINIC | Age: 79
End: 2024-12-10
Payer: MEDICARE

## 2024-12-10 VITALS — HEIGHT: 65 IN | WEIGHT: 130 LBS | BODY MASS INDEX: 21.66 KG/M2

## 2024-12-10 DIAGNOSIS — M26.609 UNSPECIFIED TEMPOROMANDIBULAR JOINT DISORDER: ICD-10-CM

## 2024-12-10 DIAGNOSIS — Z82.49 FAMILY HISTORY OF ISCHEMIC HEART DISEASE AND OTHER DISEASES OF THE CIRCULATORY SYSTEM: ICD-10-CM

## 2024-12-10 DIAGNOSIS — Z78.9 OTHER SPECIFIED HEALTH STATUS: ICD-10-CM

## 2024-12-10 DIAGNOSIS — H92.02 OTALGIA, LEFT EAR: ICD-10-CM

## 2024-12-10 DIAGNOSIS — J32.3 CHRONIC SPHENOIDAL SINUSITIS: ICD-10-CM

## 2024-12-10 DIAGNOSIS — F10.21 ALCOHOL DEPENDENCE, IN REMISSION: ICD-10-CM

## 2024-12-10 DIAGNOSIS — Z86.79 PERSONAL HISTORY OF OTHER DISEASES OF THE CIRCULATORY SYSTEM: ICD-10-CM

## 2024-12-10 DIAGNOSIS — Z82.3 FAMILY HISTORY OF STROKE: ICD-10-CM

## 2024-12-10 DIAGNOSIS — Z80.9 FAMILY HISTORY OF MALIGNANT NEOPLASM, UNSPECIFIED: ICD-10-CM

## 2024-12-10 PROCEDURE — 99203 OFFICE O/P NEW LOW 30 MIN: CPT | Mod: 25

## 2024-12-10 PROCEDURE — 31231 NASAL ENDOSCOPY DX: CPT

## 2025-09-03 ENCOUNTER — APPOINTMENT (OUTPATIENT)
Dept: NEUROSURGERY | Facility: CLINIC | Age: 80
End: 2025-09-03

## 2025-09-03 DIAGNOSIS — I67.1 CEREBRAL ANEURYSM, NONRUPTURED: ICD-10-CM

## 2025-09-03 DIAGNOSIS — M89.9 DISORDER OF BONE, UNSPECIFIED: ICD-10-CM
